# Patient Record
Sex: FEMALE | Race: WHITE | NOT HISPANIC OR LATINO
[De-identification: names, ages, dates, MRNs, and addresses within clinical notes are randomized per-mention and may not be internally consistent; named-entity substitution may affect disease eponyms.]

---

## 2019-05-30 ENCOUNTER — APPOINTMENT (OUTPATIENT)
Dept: ORTHOPEDIC SURGERY | Facility: CLINIC | Age: 54
End: 2019-05-30
Payer: COMMERCIAL

## 2019-05-30 VITALS — HEIGHT: 63 IN | BODY MASS INDEX: 28.35 KG/M2 | WEIGHT: 160 LBS

## 2019-05-30 PROBLEM — Z00.00 ENCOUNTER FOR PREVENTIVE HEALTH EXAMINATION: Status: ACTIVE | Noted: 2019-05-30

## 2019-05-30 PROCEDURE — 73562 X-RAY EXAM OF KNEE 3: CPT

## 2019-05-30 PROCEDURE — 99204 OFFICE O/P NEW MOD 45 MIN: CPT

## 2019-05-30 NOTE — REASON FOR VISIT
[Initial Visit] : an initial visit for [FreeTextEntry2] : OLD NOTES REFLECT IN [place] - RIGHT KNEE PAIN

## 2019-05-30 NOTE — HISTORY OF PRESENT ILLNESS
[de-identified] : PATIENT HERE FOR RIGHT KNEE PAIN - RE-ORDER GEL INJECTIONS Patient is complaining of worsening right knee medial pain because she has a history where she is doing quite well with Orthovisc injections in the past.\par

## 2019-05-30 NOTE — DISCUSSION/SUMMARY
[de-identified] : We will reorder Orthovisc injections and notified the patient once they are available

## 2019-05-30 NOTE — PHYSICAL EXAM
[de-identified] : Right knee has definite medial joint line tenderness or some mild warmth about the knee there is no lateral tenderness and no patellofemoral pain on palpation today. Range of motion is 0-125°. The knee is stable. [de-identified] : AP standing individual laterals and sunrise views were obtained showing moderate into the medial joint space on standing AP projection of the right knee.Patellofemoral and lateral compartments are fairly well maintained.

## 2019-07-11 ENCOUNTER — RX RENEWAL (OUTPATIENT)
Age: 54
End: 2019-07-11

## 2019-07-15 ENCOUNTER — APPOINTMENT (OUTPATIENT)
Dept: PHYSICAL MEDICINE AND REHAB | Facility: CLINIC | Age: 54
End: 2019-07-15

## 2019-08-01 ENCOUNTER — OTHER (OUTPATIENT)
Age: 54
End: 2019-08-01

## 2019-08-01 ENCOUNTER — APPOINTMENT (OUTPATIENT)
Dept: ORTHOPEDIC SURGERY | Facility: CLINIC | Age: 54
End: 2019-08-01
Payer: COMMERCIAL

## 2019-08-01 PROCEDURE — 99214 OFFICE O/P EST MOD 30 MIN: CPT | Mod: 25

## 2019-08-01 PROCEDURE — 20611 DRAIN/INJ JOINT/BURSA W/US: CPT | Mod: RT

## 2019-08-01 RX ORDER — SODIUM HYALURONATE INTRA-ARTICULAR SOLN PREF SYR 25 MG/2.5ML 25/2.5 MG/ML
25 SOLUTION PREFILLED SYRINGE INTRAARTICULAR
Qty: 6 | Refills: 0 | Status: ACTIVE | OUTPATIENT
Start: 2019-07-11

## 2019-08-01 RX ORDER — HYALURONATE SODIUM 30 MG/2 ML
30 SYRINGE (ML) INTRAARTICULAR
Qty: 3 | Refills: 0 | Status: ACTIVE | OUTPATIENT
Start: 2019-05-30

## 2019-08-01 NOTE — PROCEDURE
[de-identified] : Patient is here for the first of the series of 3 view Euflexxa injections. Patient had the injection performed under sterile conditions an ultrasound guidance. Procedure was tolerated well

## 2019-08-01 NOTE — PHYSICAL EXAM
[de-identified] : Right knee has definite medial joint line tenderness or some mild warmth about the knee there is no lateral tenderness and no patellofemoral pain on palpation today. Range of motion is 0-125°. The knee is stable.

## 2019-08-05 ENCOUNTER — OTHER (OUTPATIENT)
Age: 54
End: 2019-08-05

## 2019-08-07 ENCOUNTER — APPOINTMENT (OUTPATIENT)
Dept: ORTHOPEDIC SURGERY | Facility: CLINIC | Age: 54
End: 2019-08-07
Payer: COMMERCIAL

## 2019-08-07 VITALS — BODY MASS INDEX: 28.35 KG/M2 | WEIGHT: 160 LBS | HEIGHT: 63 IN

## 2019-08-07 PROCEDURE — 99214 OFFICE O/P EST MOD 30 MIN: CPT | Mod: 25

## 2019-08-07 PROCEDURE — 20611 DRAIN/INJ JOINT/BURSA W/US: CPT | Mod: RT

## 2019-08-07 NOTE — PHYSICAL EXAM
[de-identified] : Right knee has definite medial joint line tenderness or some mild warmth about the knee there is no lateral tenderness and no patellofemoral pain on palpation today. Range of motion is 0-125°. The knee is stable.

## 2019-08-07 NOTE — PROCEDURE
[de-identified] : Patient is here for the second  of the series of 3 view Euflexxa injections. Patient had the injection performed under sterile conditions an ultrasound guidance. Procedure was tolerated well

## 2019-08-14 ENCOUNTER — APPOINTMENT (OUTPATIENT)
Dept: ORTHOPEDIC SURGERY | Facility: CLINIC | Age: 54
End: 2019-08-14
Payer: COMMERCIAL

## 2019-08-14 VITALS — BODY MASS INDEX: 28.35 KG/M2 | HEIGHT: 63 IN | WEIGHT: 160 LBS

## 2019-08-14 PROCEDURE — 99214 OFFICE O/P EST MOD 30 MIN: CPT

## 2019-08-14 NOTE — PHYSICAL EXAM
[de-identified] : Right knee has definite medial joint line tenderness or some mild warmth about the knee there is no lateral tenderness and no patellofemoral pain on palpation today. Range of motion is 0-125°. The knee is stable.

## 2019-10-28 ENCOUNTER — APPOINTMENT (OUTPATIENT)
Dept: ORTHOPEDIC SURGERY | Facility: CLINIC | Age: 54
End: 2019-10-28
Payer: COMMERCIAL

## 2019-10-28 VITALS — BODY MASS INDEX: 28.35 KG/M2 | WEIGHT: 160 LBS | HEIGHT: 63 IN

## 2019-10-28 DIAGNOSIS — M17.12 UNILATERAL PRIMARY OSTEOARTHRITIS, LEFT KNEE: ICD-10-CM

## 2019-10-28 DIAGNOSIS — M23.92 UNSPECIFIED INTERNAL DERANGEMENT OF LEFT KNEE: ICD-10-CM

## 2019-10-28 PROCEDURE — 20610 DRAIN/INJ JOINT/BURSA W/O US: CPT | Mod: LT

## 2019-10-28 PROCEDURE — 99213 OFFICE O/P EST LOW 20 MIN: CPT | Mod: 25

## 2019-10-28 PROCEDURE — 73562 X-RAY EXAM OF KNEE 3: CPT | Mod: LT

## 2019-10-28 RX ORDER — MELOXICAM 15 MG/1
15 TABLET ORAL DAILY
Qty: 30 | Refills: 3 | Status: ACTIVE | COMMUNITY
Start: 2019-10-28 | End: 1900-01-01

## 2019-10-28 NOTE — REVIEW OF SYSTEMS
[Arthralgia] : arthralgia [Joint Pain] : joint pain [Joint Stiffness] : no joint stiffness [Joint Swelling] : no joint swelling

## 2019-10-28 NOTE — DISCUSSION/SUMMARY
[Medication Risks Reviewed] : Medication risks reviewed [de-identified] : Injection given into the left knee. I placed her on meloxicam. She will use ice. The pain doesn't improve she'll let me know.

## 2019-10-28 NOTE — HISTORY OF PRESENT ILLNESS
[de-identified] : Location: left knee\par Quality: aching ,sharp\par Duration: 1 week\par Context: Atraumatic\par Aggravating Factors: walking, bending, prolonged sitting, night time \par Conservative treatment: Elevation , compression , ice , heat otc, \par Associated Symptoms:stiffness\par Prior Studies: n.a \par

## 2019-10-28 NOTE — PHYSICAL EXAM
[de-identified] : Left knee shows no warmth or swelling. There is both medial and lateral joint line tenderness. There is no evidence of instability. There is a negative Royal test painful range of motion neurovascular exam is normal straight leg raising negative. [de-identified] : Left knee x-ray shows evidence of patellofemoral degenerative arthritis unchanged from 5/30/2019

## 2020-06-18 RX ORDER — SODIUM HYALURONATE INTRA-ARTICULAR SOLN PREF SYR 25 MG/2.5ML 25/2.5 MG/ML
25 SOLUTION PREFILLED SYRINGE INTRAARTICULAR
Qty: 1 | Refills: 0 | Status: ACTIVE | OUTPATIENT
Start: 2020-06-18

## 2020-11-20 ENCOUNTER — APPOINTMENT (OUTPATIENT)
Dept: ORTHOPEDIC SURGERY | Facility: CLINIC | Age: 55
End: 2020-11-20
Payer: COMMERCIAL

## 2020-11-20 PROCEDURE — 20611 DRAIN/INJ JOINT/BURSA W/US: CPT | Mod: LT

## 2020-11-20 PROCEDURE — 99214 OFFICE O/P EST MOD 30 MIN: CPT | Mod: 25

## 2020-11-20 NOTE — PROCEDURE
[de-identified] : Injection 1/3\par Patient was given an supartz njection 30mg/2ml  in the lateral compartment of the knee. Injection is performed under sterile conditions with ultrasound guidance. Patient tolerated procedure well. \par

## 2020-11-20 NOTE — PHYSICAL EXAM
[de-identified] : Right knee has definite medial joint line tenderness or some mild warmth about the knee there is no lateral tenderness and no patellofemoral pain on palpation today. Range of motion is 0-125°. The knee is stable. \par \par

## 2020-11-20 NOTE — HISTORY OF PRESENT ILLNESS
[de-identified] : Pt is here for injection 1/3 supartz  [Stable] : stable [5] : a current pain level of 5/10 [Walking] : walking [Sitting] : sitting [Standing] : standing

## 2020-11-23 ENCOUNTER — APPOINTMENT (OUTPATIENT)
Dept: ORTHOPEDIC SURGERY | Facility: CLINIC | Age: 55
End: 2020-11-23
Payer: COMMERCIAL

## 2020-11-23 PROCEDURE — 20611 DRAIN/INJ JOINT/BURSA W/US: CPT | Mod: RT

## 2020-11-23 PROCEDURE — 99214 OFFICE O/P EST MOD 30 MIN: CPT | Mod: 25

## 2020-11-23 RX ORDER — BUPROPION HYDROCHLORIDE 300 MG/1
300 TABLET, EXTENDED RELEASE ORAL
Qty: 90 | Refills: 0 | Status: ACTIVE | COMMUNITY
Start: 2020-07-28

## 2020-11-23 RX ORDER — ALPRAZOLAM 1 MG/1
1 TABLET ORAL
Qty: 60 | Refills: 0 | Status: ACTIVE | COMMUNITY
Start: 2020-11-03

## 2020-11-23 NOTE — PHYSICAL EXAM
[de-identified] : Left knee has definite medial joint line tenderness or some mild warmth about the knee there is no lateral tenderness and no patellofemoral pain on palpation today. Range of motion is 0-125°. The knee is stable.

## 2020-11-23 NOTE — PROCEDURE
[de-identified] : Patient was given the second of 3 injections right knee today. Patient tolerated the procedure well

## 2020-11-23 NOTE — HISTORY OF PRESENT ILLNESS
[de-identified] : Patient is here for the second of 3 right and Supartz injection series with some modest improvement.

## 2020-11-30 ENCOUNTER — APPOINTMENT (OUTPATIENT)
Dept: ORTHOPEDIC SURGERY | Facility: CLINIC | Age: 55
End: 2020-11-30
Payer: COMMERCIAL

## 2020-11-30 PROCEDURE — 99072 ADDL SUPL MATRL&STAF TM PHE: CPT

## 2020-11-30 PROCEDURE — 20611 DRAIN/INJ JOINT/BURSA W/US: CPT | Mod: RT

## 2020-11-30 PROCEDURE — 99214 OFFICE O/P EST MOD 30 MIN: CPT | Mod: 25

## 2020-11-30 NOTE — PHYSICAL EXAM
[de-identified] : Right knee has definite medial joint line tenderness or some mild warmth about the knee there is no lateral tenderness and no patellofemoral pain on palpation today. Range of motion is 0-125°. The knee is stable. \par \par

## 2020-11-30 NOTE — HISTORY OF PRESENT ILLNESS
[de-identified] : pt is here for 3/3 supartz injection.- pt is tolerating injections well. pt states today knee feels more ache but amounts that to the weather.  [Stable] : stable [5] : a current pain level of 5/10

## 2020-12-07 ENCOUNTER — APPOINTMENT (OUTPATIENT)
Dept: ORTHOPEDIC SURGERY | Facility: CLINIC | Age: 55
End: 2020-12-07

## 2021-05-10 ENCOUNTER — APPOINTMENT (OUTPATIENT)
Dept: ORTHOPEDIC SURGERY | Facility: CLINIC | Age: 56
End: 2021-05-10
Payer: COMMERCIAL

## 2021-05-10 PROCEDURE — 99072 ADDL SUPL MATRL&STAF TM PHE: CPT

## 2021-05-10 PROCEDURE — 20611 DRAIN/INJ JOINT/BURSA W/US: CPT | Mod: RT

## 2021-05-10 PROCEDURE — 99214 OFFICE O/P EST MOD 30 MIN: CPT | Mod: 25

## 2021-05-10 RX ORDER — HYALURONATE SODIUM 30 MG/2 ML
30 SYRINGE (ML) INTRAARTICULAR
Qty: 3 | Refills: 0 | Status: ACTIVE | OUTPATIENT
Start: 2021-05-10

## 2021-05-10 RX ORDER — TACROLIMUS 1 MG/G
0.1 OINTMENT TOPICAL
Qty: 30 | Refills: 0 | Status: ACTIVE | COMMUNITY
Start: 2021-03-23

## 2021-05-10 NOTE — HISTORY OF PRESENT ILLNESS
[de-identified] : Patient is here once again resumption of right knee medial pain. Pushes fairly advanced osteoarthritis has done well with Orthovisc and cortisone injections in the past.

## 2021-05-10 NOTE — PROCEDURE
[de-identified] :  The patient was given a cortisone injection to the lateral compartment right knee today. This is done sterile conditions an ultrasound guidance.Patient tolerated the procedure well

## 2021-05-10 NOTE — PHYSICAL EXAM
[de-identified] : Right knee has definite medial joint line tenderness or some mild warmth about the knee there is no lateral tenderness and no patellofemoral pain on palpation today. Range of motion is 0-125°. The knee is stable.

## 2021-08-23 RX ORDER — HYALURONATE SODIUM 30 MG/2 ML
30 SYRINGE (ML) INTRAARTICULAR
Qty: 3 | Refills: 0 | Status: ACTIVE | OUTPATIENT
Start: 2021-08-23

## 2021-08-31 ENCOUNTER — APPOINTMENT (OUTPATIENT)
Dept: ORTHOPEDIC SURGERY | Facility: CLINIC | Age: 56
End: 2021-08-31
Payer: COMMERCIAL

## 2021-08-31 PROCEDURE — 73521 X-RAY EXAM HIPS BI 2 VIEWS: CPT

## 2021-08-31 PROCEDURE — 99214 OFFICE O/P EST MOD 30 MIN: CPT

## 2021-08-31 RX ORDER — MOMETASONE FUROATE 1 MG/G
0.1 OINTMENT TOPICAL
Qty: 15 | Refills: 0 | Status: ACTIVE | COMMUNITY
Start: 2021-05-10

## 2021-08-31 RX ORDER — DIPHENOXYLATE HYDROCHLORIDE AND ATROPINE SULFATE 2.5; .025 MG/1; MG/1
2.5-0.025 TABLET ORAL
Qty: 30 | Refills: 0 | Status: ACTIVE | COMMUNITY
Start: 2021-05-10

## 2021-08-31 RX ORDER — CIPROFLOXACIN HYDROCHLORIDE 500 MG/1
500 TABLET, FILM COATED ORAL
Qty: 14 | Refills: 0 | Status: ACTIVE | COMMUNITY
Start: 2021-05-10

## 2021-08-31 RX ORDER — VALACYCLOVIR 1 G/1
1 TABLET, FILM COATED ORAL
Qty: 16 | Refills: 0 | Status: ACTIVE | COMMUNITY
Start: 2021-05-10

## 2021-08-31 RX ORDER — MUPIROCIN 20 MG/G
2 OINTMENT TOPICAL
Qty: 22 | Refills: 0 | Status: ACTIVE | COMMUNITY
Start: 2021-05-10

## 2021-08-31 RX ORDER — METHYLPREDNISOLONE 4 MG/1
4 TABLET ORAL
Qty: 1 | Refills: 0 | Status: ACTIVE | COMMUNITY
Start: 2021-08-31 | End: 1900-01-01

## 2021-08-31 RX ORDER — METHYLPREDNISOLONE 4 MG/1
4 TABLET ORAL
Qty: 21 | Refills: 0 | Status: ACTIVE | COMMUNITY
Start: 2021-05-10

## 2021-08-31 NOTE — PROCEDURE
Chief Complaint   Patient presents with   • Knee Pain     ambulates limping in triage c/o bilateral knee pain x 3 months. has hx of meniscus tear/surgery in left knee.      Denies trauma/fall. Educated on triage process. Instructed to notify staff for any worsening symptoms. Denies any recent travel. Denies exposure to known covid positive patients. Denies any respiratory symptoms.    [de-identified] : Injection 3/3\par Patient was given an supartz injection 30mg/2ml  in the lateral compartment of the RIGHT knee. Injection is performed under sterile conditions with ultrasound guidance. Patient tolerated procedure well. \par

## 2021-08-31 NOTE — HISTORY OF PRESENT ILLNESS
[de-identified] : This patient presents today with a new complaint of right buttock and lower back pain. His ongoing for about 2 weeks she'll call no specific accident or injury she states it seems to be most symptomatic when she is resting and lying down at night pain is nonradiating she denies any change in bowel or bladder habits she denies also any weakness or numbness in the right lower extremity pain does radiate a little bit into the thigh.

## 2021-08-31 NOTE — REASON FOR VISIT
[Follow-Up Visit] : a follow-up visit for [FreeTextEntry2] : RIGHT HIP PAIN AND SWELLING ([place] ARE[place]) WANTE D  TO GET NEW XRAYS

## 2021-08-31 NOTE — PHYSICAL EXAM
[de-identified] : Patient's lumbar exam she has full passive internal and external rotation of the right hip with no restrictions or pain no significant right quad atrophy is noted she is neurovascularly intact distally negative straight leg raising test. Patient does have tenderness palpation of the right-sided paraspinal musculature. [de-identified] : AP lateral of both hips were obtained showing notice any bony abnormalities cartilage spaces are well-maintained

## 2021-08-31 NOTE — DISCUSSION/SUMMARY
[Medication Risks Reviewed] : Medication risks reviewed [de-identified] : We talked about her options patient's clinical exam history and radiographs were consistent with right-sided lumbar radiculopathy. We'll place her on a Medrol Dosepak a reasonable risk benefits of medication discussed patient will follow up with me in 10 days to 2 weeks if not thoroughly improved.

## 2021-12-03 ENCOUNTER — APPOINTMENT (OUTPATIENT)
Dept: ORTHOPEDIC SURGERY | Facility: CLINIC | Age: 56
End: 2021-12-03
Payer: COMMERCIAL

## 2021-12-03 PROCEDURE — 99214 OFFICE O/P EST MOD 30 MIN: CPT | Mod: 25

## 2021-12-03 PROCEDURE — 20611 DRAIN/INJ JOINT/BURSA W/US: CPT | Mod: RT

## 2021-12-03 NOTE — PHYSICAL EXAM
[de-identified] : Right knee has definite medial joint line tenderness or some mild warmth about the knee there is no lateral tenderness and no patellofemoral pain on palpation today. Range of motion is 0-125°. The knee is stable. \par \par

## 2021-12-03 NOTE — PROCEDURE
[de-identified] : Injection 1/3\par Patient was given an Orthovisc injection 30mg/2ml  in the lateral compartment of the knee. Injection is performed under sterile conditions with ultrasound guidance. Patient tolerated procedure well. \par

## 2021-12-10 ENCOUNTER — APPOINTMENT (OUTPATIENT)
Dept: ORTHOPEDIC SURGERY | Facility: CLINIC | Age: 56
End: 2021-12-10
Payer: COMMERCIAL

## 2021-12-10 PROCEDURE — 20611 DRAIN/INJ JOINT/BURSA W/US: CPT | Mod: RT

## 2021-12-10 NOTE — PHYSICAL EXAM
[de-identified] : Right knee has definite medial joint line tenderness or some mild warmth about the knee there is no lateral tenderness and no patellofemoral pain on palpation today. Range of motion is 0-125°. The knee is stable. \par \par

## 2021-12-10 NOTE — PROCEDURE
[de-identified] : Injection 2/3\par Patient was given an Orthovisc injection 30mg/2ml  in the lateral compartment of the knee. Injection is performed under sterile conditions with ultrasound guidance. Patient tolerated procedure well. \par

## 2021-12-17 ENCOUNTER — APPOINTMENT (OUTPATIENT)
Dept: ORTHOPEDIC SURGERY | Facility: CLINIC | Age: 56
End: 2021-12-17
Payer: COMMERCIAL

## 2021-12-17 PROCEDURE — 99214 OFFICE O/P EST MOD 30 MIN: CPT | Mod: 25

## 2021-12-17 PROCEDURE — 20611 DRAIN/INJ JOINT/BURSA W/US: CPT | Mod: RT

## 2021-12-17 NOTE — PROCEDURE
[de-identified] : Patient was given a third and final left knee HA injection. This was done and sterile conditions and ultrasound guidance. Patient tolerated the procedure well.

## 2021-12-17 NOTE — PHYSICAL EXAM
[de-identified] : Right knee continues to have some mild warmth and soft tissue swelling as compared to the left range of motion 0-125° stable to AP stress varus stress

## 2021-12-17 NOTE — DISCUSSION/SUMMARY
[de-identified] : Patient will consider knee replacement surgery do not see sustained symptomatic improvement with these conservative measures.

## 2023-05-08 NOTE — PROCEDURE
[de-identified] : Patient is here for the THIRD of the series of 3 view Euflexxa injections. Patient had the injection performed under sterile conditions an ultrasound guidance. Procedure was tolerated well
Unable to assess hearing

## 2023-08-01 ENCOUNTER — APPOINTMENT (OUTPATIENT)
Dept: ORTHOPEDIC SURGERY | Facility: CLINIC | Age: 58
End: 2023-08-01
Payer: COMMERCIAL

## 2023-08-01 PROCEDURE — 99214 OFFICE O/P EST MOD 30 MIN: CPT

## 2023-08-01 RX ORDER — HYALURONATE SODIUM 30 MG/2 ML
30 SYRINGE (ML) INTRAARTICULAR
Qty: 3 | Refills: 0 | Status: ACTIVE | OUTPATIENT
Start: 2023-08-01

## 2023-08-01 RX ORDER — HYALURONATE SODIUM, STABILIZED 88 MG/4 ML
88 SYRINGE (ML) INTRAARTICULAR
Qty: 1 | Refills: 0 | Status: ACTIVE | OUTPATIENT
Start: 2023-08-01

## 2023-08-10 NOTE — HISTORY OF PRESENT ILLNESS
[de-identified] : Well-known patient returns today complaining of left knee pain.  Patient seen most recent December 21, 2022 at that point she was given an HA injection.  She did well for a while but recent exacerbation of left knee pain especially with stair climbing getting out of seated position pain is mostly anterior in the knee.

## 2023-08-10 NOTE — PHYSICAL EXAM
[de-identified] : Left knee exam today patient has tenderness to the medial joint line range of motion 0 to 125 degrees knee stable to stress varus valgus stress with full extension and 90 degrees flexion there is warmth noted soft tissue and with no obvious effusion.  Quad tone is good.

## 2023-08-10 NOTE — DISCUSSION/SUMMARY
[de-identified] : Patient I discussed at length the underlying etiology of her left knee pain.  I believe she is suffering with osteoarthritic flare.  Patient will be sent for repeat radiographs.  In the past she has done well with HA injections.  These will be reordered pending the reading of the radiographs to make sure there is no significant advancement of the radiographic findings of arthritis.  Today's consultation lasted 35.

## 2023-08-10 NOTE — REASON FOR VISIT
[Follow-Up Visit] : a follow-up visit for [Knee Pain] : knee pain [FreeTextEntry2] : RIGHT KNEE PAIN. PATIENT RECIEVED THE ORTHO GEL INJECTIONS IN THE PAST AND WANTS TO HAVE AN UPDATED VISIT TO GET THOSE GELS AGAIN

## 2023-08-11 ENCOUNTER — APPOINTMENT (OUTPATIENT)
Dept: ORTHOPEDIC SURGERY | Facility: CLINIC | Age: 58
End: 2023-08-11
Payer: COMMERCIAL

## 2023-08-11 DIAGNOSIS — E66.3 OVERWEIGHT: ICD-10-CM

## 2023-08-11 PROCEDURE — 99213 OFFICE O/P EST LOW 20 MIN: CPT | Mod: 25

## 2023-08-11 PROCEDURE — 20611 DRAIN/INJ JOINT/BURSA W/US: CPT | Mod: RT

## 2023-08-11 PROCEDURE — 73562 X-RAY EXAM OF KNEE 3: CPT | Mod: 50

## 2023-08-11 NOTE — PROCEDURE
[de-identified] : SUPARTZ FX BIOVENTUS NDC [address]-3 LOT# [id number] EXP 2026/08/31 25MG/ 2.5ML  Patient given a one-time Supartz injection of the lateral compartment the right knee today.  This done in sterile conditions and ultrasound guidance patient tolerated injection well.

## 2023-08-11 NOTE — DISCUSSION/SUMMARY
[de-identified] : In addition to today's HA injection we talked at length about conservative measures to help reduce her discomfort including liberal icing of the knee and anti-inflammatory use.  The patient does not see sustained symptomatic improvement and may consider knee replacement surgery sometime over the upcoming winter.  We also talked at length today about needing to employ strategy to help her lose her weight and reduce her BMI.  We talked at length about how weight and BMI reduction will help improve her current symptoms also improve the efficacy of today's injection and potentially reduce perioperative complications at time of knee replacement surgery in the future.  Today's consultation per weight/BMI reduction lasted 20 minutes.

## 2023-08-11 NOTE — REASON FOR VISIT
[Follow-Up Visit] : a follow-up visit for [Knee Pain] : knee pain [FreeTextEntry2] : RIGHT KNEE SUPARTZ GEL INJ

## 2023-08-11 NOTE — PHYSICAL EXAM
[de-identified] : Right knee exam today patient has tenderness to the medial joint line range of motion 0 to 125 degrees knee stable to stress varus valgus stress with full extension and 90 degrees flexion there is warmth noted soft tissue and with no obvious effusion.  Quad tone is good. [de-identified] : Knee radiographs were ordered today AP standing individual lateral sunrise views were obtained showing near complete cartilage loss in the medial aspect of the right knee with only 5 to 10% remaining cartilage patellofemoral articulation also shows moderate narrowing.

## 2023-08-17 ENCOUNTER — APPOINTMENT (OUTPATIENT)
Dept: ORTHOPEDIC SURGERY | Facility: CLINIC | Age: 58
End: 2023-08-17
Payer: COMMERCIAL

## 2023-08-17 DIAGNOSIS — M17.0 BILATERAL PRIMARY OSTEOARTHRITIS OF KNEE: ICD-10-CM

## 2023-08-17 PROCEDURE — 20611 DRAIN/INJ JOINT/BURSA W/US: CPT | Mod: 50

## 2023-08-19 NOTE — ASSESSMENT
[FreeTextEntry1] : Discussed at length with patient underlying arthritis and treatment options.  Patient elects viscosupplementation today.

## 2023-08-19 NOTE — PHYSICAL EXAM
[de-identified] : Bilateral knee  Constitutional:  The patient is healthy-appearing and in no apparent distress.   Gait: The patient ambulates with a normal gait and no limp.  Cardiovascular System:  The capillary refill is less than 2 seconds.   Skin:  There are no skin abnormalities.  Right Knee:   Bony Palpation:  There is tenderness of the medial joint line.  There is no tenderness of the lateral joint line. There is tenderness of the medial femoral chondyle. There is no tenderness of the lateral femoral chondyle. There is no tenderness of the tibial tubercle. There is no tenderness of the superior patella. There is no tenderness of the inferior patella. There is tenderness of the medial patellar facet. There is tenderness of the lateral patellar facet.  Soft Tissue Palpation:  There is no tenderness of the medial retinaculum. There is no tenderness of the lateral retinaculum. There is no tenderness of the quadriceps tendon. There is no tenderness of the patella tendon. There is no tenderness of the ITB. There is no tenderness of the pes anserine.  Active Range of Motion:  The range of motion at the knee actively and passively is full.   Special Tests:  There is a negative Apley. There is a negative Steinmanns.  There is a negative Lachman and Anterior Drawer. There is a negative Posterior Drawer.   There is no varus or valgus laxity.  Strength:  There is 5/5 hip flexion and 5/5 knee flexion and extension.    Left Knee:   Bony Palpation:  There is  tenderness of the medial joint line.  There is no tenderness of the lateral joint line. There is tenderness of the medial femoral chondyle. There is tenderness of the lateral femoral chondyle. There is no tenderness of the tibial tubercle. There is no tenderness of the superior patella. There is no tenderness of the inferior patella. There is tenderness of the medial patellar facet. There is tenderness of the lateral patellar facet.  Soft Tissue Palpation:  There is no tenderness of the medial retinaculum. There is no tenderness of the lateral retinaculum. There is no tenderness of the quadriceps tendon. There is no tenderness of the patella tendon. There is no tenderness of the ITB. There is no tenderness of the pes anserine.  Active Range of Motion:  The range of motion at the knee actively and passively is full.   Special Tests:  There is a negative Apley. There is a negative Steinmanns.  There is a negative Lachman and Anterior Drawer. There is a negative Posterior Drawer.   There is no varus or valgus laxity.  Strength:  There is 5/5 hip flexion and 5/5 knee flexion and extension.    Psychiatric:  The patient demonstrates a normal mood and affect and is active and alert

## 2023-08-19 NOTE — HISTORY OF PRESENT ILLNESS
[de-identified] : Patient is a new patient presenting for both knees she had seen a colleague 1 week ago presenting for further treatment discussion and options

## 2023-08-19 NOTE — PROCEDURE
[de-identified] : After discussion of the risks and benefits, the patient elects Supartz injections to the knees.  Confirmed that the patient does not have history of prior adverse reactions, active infections, or relevant allergies.  There was no effusion, erythema, or warmth, and the skin was clear. The skin was prepped in the usual sterile manner, ethyl chloride spray was used as a topical anesthetic.  A needle was inserted  UTILIZING ULTRASOUND GUIDANCE TO LOCALIZE THE NEEDLE IN THE KNEE JOINT into the RIGHT KNEE via an anterolateral approach.  Viscosupplement was then slowly injected, The injection was completed without complication and a bandage was applied. The patient tolerated the procedure well and was given post-injection instructions: no exercise x 48 hours, cold packs and analgesics prn.

## 2023-08-24 ENCOUNTER — APPOINTMENT (OUTPATIENT)
Dept: ORTHOPEDIC SURGERY | Facility: CLINIC | Age: 58
End: 2023-08-24
Payer: COMMERCIAL

## 2023-08-24 PROCEDURE — 99213 OFFICE O/P EST LOW 20 MIN: CPT | Mod: 25

## 2023-08-24 PROCEDURE — 20611 DRAIN/INJ JOINT/BURSA W/US: CPT | Mod: RT

## 2023-08-24 NOTE — HISTORY OF PRESENT ILLNESS
[de-identified] : Follow up on 3/3 SUPARTZ at Right Knee Last Visit: 08/17/2023- 2nd Supartz Injection Administered at Right Knee Pain Level: 4/10 Symptoms: Minor Swelling

## 2023-08-24 NOTE — PROCEDURE
[de-identified] : After discussion of the risks and benefits, the patient elects Supartz injections to the knees.  Confirmed that the patient does not have history of prior adverse reactions, active infections, or relevant allergies.  There was no effusion, erythema, or warmth, and the skin was clear. The skin was prepped in the usual sterile manner, ethyl chloride spray was used as a topical anesthetic.  A needle was inserted  UTILIZING ULTRASOUND GUIDANCE TO LOCALIZE THE NEEDLE IN THE KNEE JOINT into the RIGHT KNEE via an anterolateral approach.  Viscosupplement was then slowly injected, The injection was completed without complication and a bandage was applied. The patient tolerated the procedure well and was given post-injection instructions: no exercise x 48 hours, cold packs and analgesics prn.

## 2023-08-24 NOTE — PHYSICAL EXAM
[de-identified] : Bilateral knee  Constitutional:  The patient is healthy-appearing and in no apparent distress.   Gait: The patient ambulates with a normal gait and no limp.  Cardiovascular System:  The capillary refill is less than 2 seconds.   Skin:  There are no skin abnormalities.  Right Knee:   Bony Palpation:  There is tenderness of the medial joint line.  There is no tenderness of the lateral joint line. There is tenderness of the medial femoral chondyle. There is no tenderness of the lateral femoral chondyle. There is no tenderness of the tibial tubercle. There is no tenderness of the superior patella. There is no tenderness of the inferior patella. There is tenderness of the medial patellar facet. There is tenderness of the lateral patellar facet.  Soft Tissue Palpation:  There is no tenderness of the medial retinaculum. There is no tenderness of the lateral retinaculum. There is no tenderness of the quadriceps tendon. There is no tenderness of the patella tendon. There is no tenderness of the ITB. There is no tenderness of the pes anserine.  Active Range of Motion:  The range of motion at the knee actively and passively is full.   Special Tests:  There is a negative Apley. There is a negative Steinmanns.  There is a negative Lachman and Anterior Drawer. There is a negative Posterior Drawer.   There is no varus or valgus laxity.  Strength:  There is 5/5 hip flexion and 5/5 knee flexion and extension.    Left Knee:   Bony Palpation:  There is  tenderness of the medial joint line.  There is no tenderness of the lateral joint line. There is tenderness of the medial femoral chondyle. There is tenderness of the lateral femoral chondyle. There is no tenderness of the tibial tubercle. There is no tenderness of the superior patella. There is no tenderness of the inferior patella. There is tenderness of the medial patellar facet. There is tenderness of the lateral patellar facet.  Soft Tissue Palpation:  There is no tenderness of the medial retinaculum. There is no tenderness of the lateral retinaculum. There is no tenderness of the quadriceps tendon. There is no tenderness of the patella tendon. There is no tenderness of the ITB. There is no tenderness of the pes anserine.  Active Range of Motion:  The range of motion at the knee actively and passively is full.   Special Tests:  There is a negative Apley. There is a negative Steinmanns.  There is a negative Lachman and Anterior Drawer. There is a negative Posterior Drawer.   There is no varus or valgus laxity.  Strength:  There is 5/5 hip flexion and 5/5 knee flexion and extension.    Psychiatric:  The patient demonstrates a normal mood and affect and is active and alert

## 2023-11-28 ENCOUNTER — APPOINTMENT (OUTPATIENT)
Dept: ORTHOPEDIC SURGERY | Facility: CLINIC | Age: 58
End: 2023-11-28
Payer: COMMERCIAL

## 2023-11-28 PROCEDURE — 99215 OFFICE O/P EST HI 40 MIN: CPT

## 2024-01-16 ENCOUNTER — TRANSCRIPTION ENCOUNTER (OUTPATIENT)
Age: 59
End: 2024-01-16

## 2024-01-16 VITALS
HEIGHT: 62 IN | SYSTOLIC BLOOD PRESSURE: 109 MMHG | RESPIRATION RATE: 16 BRPM | WEIGHT: 138.23 LBS | DIASTOLIC BLOOD PRESSURE: 68 MMHG | TEMPERATURE: 98 F | HEART RATE: 65 BPM

## 2024-01-16 RX ORDER — POVIDONE-IODINE 5 %
1 AEROSOL (ML) TOPICAL ONCE
Refills: 0 | Status: COMPLETED | OUTPATIENT
Start: 2024-01-17 | End: 2024-01-17

## 2024-01-16 RX ORDER — TIRZEPATIDE 15 MG/.5ML
5 INJECTION, SOLUTION SUBCUTANEOUS
Refills: 0 | DISCHARGE

## 2024-01-16 NOTE — ASU PATIENT PROFILE, ADULT - FALL HARM RISK - UNIVERSAL INTERVENTIONS
Bed in lowest position, wheels locked, appropriate side rails in place/Call bell, personal items and telephone in reach/Instruct patient to call for assistance before getting out of bed or chair/Non-slip footwear when patient is out of bed/[place] to call system/Physically safe environment - no spills, clutter or unnecessary equipment/Purposeful Proactive Rounding/Room/bathroom lighting operational, light cord in reach Bed in lowest position, wheels locked, appropriate side rails in place/Call bell, personal items and telephone in reach/Instruct patient to call for assistance before getting out of bed or chair/Non-slip footwear when patient is out of bed/[place] to call system/Physically safe environment - no spills, clutter or unnecessary equipment/Purposeful Proactive Rounding/Room/bathroom lighting operational, light cord in reach

## 2024-01-16 NOTE — H&P ADULT - PROBLEM SELECTOR PLAN 1
Admit to Orthopedic Service   For elective Right Total Knee Replacement   Medically cleared and optimized for surgery by Dr. Briceño

## 2024-01-16 NOTE — H&P ADULT - NSHPPHYSICALEXAM_GEN_ALL_CORE
Gen: 57 y/o female, NAD  MSK: decreased ROM 2/2 pain at the right knee       Remainder of exam per medical clearance note Gen: 59 y/o female, NAD  MSK: decreased ROM 2/2 pain at the right knee       Remainder of exam per medical clearance note Gen: 57 y/o female, NAD  MSK: MSK: Skin warm and well perfused. DP pulse right lower extremity.  Sensation intact and equal bilateral lower extremities. EHL/TA/GS/FHL 5/5 bilateral lower extremities. decreased ROM 2/2 pain at the right knee       Remainder of exam per medical clearance note

## 2024-01-16 NOTE — H&P ADULT - NSHPLABSRESULTS_GEN_ALL_CORE
Preop CBC, BMP, PT/INR, PTT within normal range  Cr 0.82  H/H 13.2/39.3  A1C 4.8  UAL trace leuks WBC 0-5  Preop EKG NSR and reviewed per medical clearance

## 2024-01-16 NOTE — H&P ADULT - HISTORY OF PRESENT ILLNESS
59 y/o female with right knee pain x  Patient elects to undergo right total knee replacement with Dr. Davalos  57 y/o female with right knee pain despite conservative therapies for her symptoms including right knee injections and OTC pain control with aleve. Denies history of DVT.   Patient elects to undergo right total knee replacement with Dr. Davalos  59 y/o female with right knee pain despite conservative therapies for her symptoms including right knee injections and OTC pain control with aleve. Denies history of DVT.   Patient elects to undergo right total knee replacement with Dr. Davalos

## 2024-01-17 ENCOUNTER — INPATIENT (INPATIENT)
Facility: HOSPITAL | Age: 59
LOS: 0 days | Discharge: HOME | DRG: 470 | End: 2024-01-18
Attending: SPECIALIST | Admitting: SPECIALIST
Payer: COMMERCIAL

## 2024-01-17 ENCOUNTER — RESULT REVIEW (OUTPATIENT)
Age: 59
End: 2024-01-17

## 2024-01-17 ENCOUNTER — APPOINTMENT (OUTPATIENT)
Dept: ORTHOPEDIC SURGERY | Facility: HOSPITAL | Age: 59
End: 2024-01-17
Payer: COMMERCIAL

## 2024-01-17 DIAGNOSIS — F41.9 ANXIETY DISORDER, UNSPECIFIED: ICD-10-CM

## 2024-01-17 DIAGNOSIS — Z98.890 OTHER SPECIFIED POSTPROCEDURAL STATES: Chronic | ICD-10-CM

## 2024-01-17 DIAGNOSIS — M17.11 UNILATERAL PRIMARY OSTEOARTHRITIS, RIGHT KNEE: ICD-10-CM

## 2024-01-17 PROCEDURE — 73560 X-RAY EXAM OF KNEE 1 OR 2: CPT | Mod: 26,RT

## 2024-01-17 PROCEDURE — 27447 TOTAL KNEE ARTHROPLASTY: CPT | Mod: AS,RT

## 2024-01-17 PROCEDURE — 27447 TOTAL KNEE ARTHROPLASTY: CPT | Mod: RT

## 2024-01-17 DEVICE — PATELLA JOURNEY 29MM 1.5 MM: Type: IMPLANTABLE DEVICE | Site: RIGHT | Status: FUNCTIONAL

## 2024-01-17 DEVICE — PIN TROCAR GEN 1/8 X 3IN: Type: IMPLANTABLE DEVICE | Site: RIGHT | Status: FUNCTIONAL

## 2024-01-17 DEVICE — IMPLANTABLE DEVICE: Type: IMPLANTABLE DEVICE | Site: RIGHT | Status: FUNCTIONAL

## 2024-01-17 DEVICE — BASEPLATE TIB JRNY NP SZ 2 RT: Type: IMPLANTABLE DEVICE | Site: RIGHT | Status: FUNCTIONAL

## 2024-01-17 DEVICE — PIN MIS RIMMED 3.2X45MM STRL: Type: IMPLANTABLE DEVICE | Site: RIGHT | Status: FUNCTIONAL

## 2024-01-17 DEVICE — FEM COMP JRNY II BCS BICRUCIATE RT SZ 3: Type: IMPLANTABLE DEVICE | Site: RIGHT | Status: FUNCTIONAL

## 2024-01-17 DEVICE — CEMENT PALACOS R: Type: IMPLANTABLE DEVICE | Site: RIGHT | Status: FUNCTIONAL

## 2024-01-17 RX ORDER — ALPRAZOLAM 0.25 MG
1 TABLET ORAL
Refills: 0 | DISCHARGE

## 2024-01-17 RX ORDER — DIPHENHYDRAMINE HCL 50 MG
25 CAPSULE ORAL EVERY 6 HOURS
Refills: 0 | Status: DISCONTINUED | OUTPATIENT
Start: 2024-01-17 | End: 2024-01-18

## 2024-01-17 RX ORDER — TRAMADOL HYDROCHLORIDE 50 MG/1
50 TABLET ORAL EVERY 6 HOURS
Refills: 0 | Status: DISCONTINUED | OUTPATIENT
Start: 2024-01-17 | End: 2024-01-17

## 2024-01-17 RX ORDER — HYDROMORPHONE HYDROCHLORIDE 2 MG/ML
0.5 INJECTION INTRAMUSCULAR; INTRAVENOUS; SUBCUTANEOUS
Refills: 0 | Status: DISCONTINUED | OUTPATIENT
Start: 2024-01-17 | End: 2024-01-17

## 2024-01-17 RX ORDER — CELECOXIB 200 MG/1
400 CAPSULE ORAL ONCE
Refills: 0 | Status: COMPLETED | OUTPATIENT
Start: 2024-01-17 | End: 2024-01-17

## 2024-01-17 RX ORDER — ASPIRIN/CALCIUM CARB/MAGNESIUM 324 MG
325 TABLET ORAL DAILY
Refills: 0 | Status: DISCONTINUED | OUTPATIENT
Start: 2024-01-17 | End: 2024-01-18

## 2024-01-17 RX ORDER — OXYCODONE HYDROCHLORIDE 5 MG/1
5 TABLET ORAL
Refills: 0 | Status: DISCONTINUED | OUTPATIENT
Start: 2024-01-17 | End: 2024-01-17

## 2024-01-17 RX ORDER — CEFAZOLIN SODIUM 1 G
2000 VIAL (EA) INJECTION EVERY 8 HOURS
Refills: 0 | Status: COMPLETED | OUTPATIENT
Start: 2024-01-17 | End: 2024-01-18

## 2024-01-17 RX ORDER — HYDROMORPHONE HYDROCHLORIDE 2 MG/ML
0.5 INJECTION INTRAMUSCULAR; INTRAVENOUS; SUBCUTANEOUS
Refills: 0 | Status: COMPLETED | OUTPATIENT
Start: 2024-01-17 | End: 2024-01-24

## 2024-01-17 RX ORDER — HYDROMORPHONE HYDROCHLORIDE 2 MG/ML
0.5 INJECTION INTRAMUSCULAR; INTRAVENOUS; SUBCUTANEOUS EVERY 4 HOURS
Refills: 0 | Status: DISCONTINUED | OUTPATIENT
Start: 2024-01-17 | End: 2024-01-18

## 2024-01-17 RX ORDER — ACETAMINOPHEN 500 MG
1000 TABLET ORAL EVERY 8 HOURS
Refills: 0 | Status: DISCONTINUED | OUTPATIENT
Start: 2024-01-17 | End: 2024-01-18

## 2024-01-17 RX ORDER — ACETAMINOPHEN 500 MG
650 TABLET ORAL EVERY 6 HOURS
Refills: 0 | Status: DISCONTINUED | OUTPATIENT
Start: 2024-01-17 | End: 2024-01-17

## 2024-01-17 RX ORDER — BENZOCAINE AND MENTHOL 5; 1 G/100ML; G/100ML
1 LIQUID ORAL DAILY
Refills: 0 | Status: DISCONTINUED | OUTPATIENT
Start: 2024-01-17 | End: 2024-01-18

## 2024-01-17 RX ORDER — ONDANSETRON 8 MG/1
4 TABLET, FILM COATED ORAL EVERY 6 HOURS
Refills: 0 | Status: DISCONTINUED | OUTPATIENT
Start: 2024-01-17 | End: 2024-01-18

## 2024-01-17 RX ORDER — SENNA PLUS 8.6 MG/1
2 TABLET ORAL AT BEDTIME
Refills: 0 | Status: DISCONTINUED | OUTPATIENT
Start: 2024-01-17 | End: 2024-01-18

## 2024-01-17 RX ORDER — OXYCODONE HYDROCHLORIDE 5 MG/1
10 TABLET ORAL EVERY 4 HOURS
Refills: 0 | Status: DISCONTINUED | OUTPATIENT
Start: 2024-01-17 | End: 2024-01-18

## 2024-01-17 RX ORDER — OXYCODONE HYDROCHLORIDE 5 MG/1
5 TABLET ORAL ONCE
Refills: 0 | Status: DISCONTINUED | OUTPATIENT
Start: 2024-01-17 | End: 2024-01-17

## 2024-01-17 RX ORDER — OXYCODONE HYDROCHLORIDE 5 MG/1
5 TABLET ORAL EVERY 4 HOURS
Refills: 0 | Status: DISCONTINUED | OUTPATIENT
Start: 2024-01-17 | End: 2024-01-18

## 2024-01-17 RX ORDER — BUPROPION HYDROCHLORIDE 150 MG/1
1 TABLET, EXTENDED RELEASE ORAL
Refills: 0 | DISCHARGE

## 2024-01-17 RX ORDER — CHLORHEXIDINE GLUCONATE 213 G/1000ML
1 SOLUTION TOPICAL ONCE
Refills: 0 | Status: COMPLETED | OUTPATIENT
Start: 2024-01-17 | End: 2024-01-17

## 2024-01-17 RX ORDER — POLYETHYLENE GLYCOL 3350 17 G/17G
17 POWDER, FOR SOLUTION ORAL AT BEDTIME
Refills: 0 | Status: DISCONTINUED | OUTPATIENT
Start: 2024-01-17 | End: 2024-01-18

## 2024-01-17 RX ORDER — BUPROPION HYDROCHLORIDE 150 MG/1
300 TABLET, EXTENDED RELEASE ORAL DAILY
Refills: 0 | Status: DISCONTINUED | OUTPATIENT
Start: 2024-01-17 | End: 2024-01-18

## 2024-01-17 RX ORDER — MAGNESIUM HYDROXIDE 400 MG/1
30 TABLET, CHEWABLE ORAL DAILY
Refills: 0 | Status: DISCONTINUED | OUTPATIENT
Start: 2024-01-17 | End: 2024-01-18

## 2024-01-17 RX ORDER — SODIUM CHLORIDE 9 MG/ML
1000 INJECTION, SOLUTION INTRAVENOUS
Refills: 0 | Status: DISCONTINUED | OUTPATIENT
Start: 2024-01-18 | End: 2024-01-18

## 2024-01-17 RX ORDER — ALPRAZOLAM 0.25 MG
1 TABLET ORAL DAILY
Refills: 0 | Status: DISCONTINUED | OUTPATIENT
Start: 2024-01-17 | End: 2024-01-18

## 2024-01-17 RX ADMIN — HYDROMORPHONE HYDROCHLORIDE 0.5 MILLIGRAM(S): 2 INJECTION INTRAMUSCULAR; INTRAVENOUS; SUBCUTANEOUS at 15:46

## 2024-01-17 RX ADMIN — Medication 1000 MILLIGRAM(S): at 22:02

## 2024-01-17 RX ADMIN — Medication 25 MILLIGRAM(S): at 15:59

## 2024-01-17 RX ADMIN — Medication 1 APPLICATION(S): at 10:23

## 2024-01-17 RX ADMIN — POLYETHYLENE GLYCOL 3350 17 GRAM(S): 17 POWDER, FOR SOLUTION ORAL at 22:03

## 2024-01-17 RX ADMIN — SENNA PLUS 2 TABLET(S): 8.6 TABLET ORAL at 22:03

## 2024-01-17 RX ADMIN — OXYCODONE HYDROCHLORIDE 10 MILLIGRAM(S): 5 TABLET ORAL at 21:45

## 2024-01-17 RX ADMIN — HYDROMORPHONE HYDROCHLORIDE 0.5 MILLIGRAM(S): 2 INJECTION INTRAMUSCULAR; INTRAVENOUS; SUBCUTANEOUS at 16:29

## 2024-01-17 RX ADMIN — CELECOXIB 400 MILLIGRAM(S): 200 CAPSULE ORAL at 10:25

## 2024-01-17 RX ADMIN — Medication 100 MILLIGRAM(S): at 22:03

## 2024-01-17 RX ADMIN — OXYCODONE HYDROCHLORIDE 10 MILLIGRAM(S): 5 TABLET ORAL at 20:44

## 2024-01-17 RX ADMIN — CHLORHEXIDINE GLUCONATE 1 APPLICATION(S): 213 SOLUTION TOPICAL at 10:23

## 2024-01-17 RX ADMIN — OXYCODONE HYDROCHLORIDE 5 MILLIGRAM(S): 5 TABLET ORAL at 10:25

## 2024-01-17 NOTE — PROGRESS NOTE ADULT - SUBJECTIVE AND OBJECTIVE BOX
Ortho Post Op Check    Procedure:  Surgeon:    Pt comfortable without complaints, pain controlled  Denies CP, SOB, N/V, numbness/tingling     Vital Signs Last 24 Hrs  T(C): 36.6 (01-17-24 @ 20:32), Max: 36.6 (01-17-24 @ 20:32)  T(F): 97.8 (01-17-24 @ 20:32), Max: 97.8 (01-17-24 @ 20:32)  HR: 65 (01-17-24 @ 20:32) (64 - 65)  BP: 138/75 (01-17-24 @ 20:32) (138/75 - 148/74)  BP(mean): 96 (01-17-24 @ 20:32) (96 - 96)  RR: 17 (01-17-24 @ 20:32) (17 - 18)  SpO2: 97% (01-17-24 @ 20:32) (97% - 98%)  I&O's Summary    17 Jan 2024 07:01  -  17 Jan 2024 22:45  --------------------------------------------------------  IN: 440 mL / OUT: 1350 mL / NET: -910 mL        General: Pt Alert and oriented, NAD  Mauricio DSG C/D/I  Pulses: 2+ DP/PT  Sensation: SILT b/l  Motor: Quad/Ham/EHL/FHL/TA/GS 5/5        Post-op X-Ray: Implants well aligned    A/P: 58yFemale POD#0 s/p R TKA  - Stable  - Pain Control  - DVT ppx:    - Post op abx: Ancef x 2 doses  - PT, WBS: WBAT  - Dispo: Pending PT eval      Ortho Pager [phone]   Ortho Post Op Check    Procedure: R TKA  Surgeon: Dr. Davalos    Pt comfortable without complaints, pain controlled  Denies CP, SOB, N/V, numbness/tingling     Vital Signs Last 24 Hrs  T(C): 36.6 (01-17-24 @ 20:32), Max: 36.6 (01-17-24 @ 20:32)  T(F): 97.8 (01-17-24 @ 20:32), Max: 97.8 (01-17-24 @ 20:32)  HR: 65 (01-17-24 @ 20:32) (64 - 65)  BP: 138/75 (01-17-24 @ 20:32) (138/75 - 148/74)  BP(mean): 96 (01-17-24 @ 20:32) (96 - 96)  RR: 17 (01-17-24 @ 20:32) (17 - 18)  SpO2: 97% (01-17-24 @ 20:32) (97% - 98%)  I&O's Summary    17 Jan 2024 07:01  -  17 Jan 2024 22:45  --------------------------------------------------------  IN: 440 mL / OUT: 1350 mL / NET: -910 mL        General: Pt Alert and oriented, NAD  Mauricio DSG C/D/I  Pulses: 2+ DP/PT  Sensation: SILT b/l  Motor: Quad/Ham/EHL/FHL/TA/GS 5/5        Post-op X-Ray: Implants well aligned    A/P: 58yFemale POD#0 s/p R TKA  - Stable  - Pain Control  - DVT ppx:    - Post op abx: Ancef x 2 doses  - PT, WBS: WBAT  - Dispo: Pending PT eval      Ortho Pager [phone]

## 2024-01-17 NOTE — PHYSICAL THERAPY INITIAL EVALUATION ADULT - PERTINENT HX OF CURRENT PROBLEM, REHAB EVAL
Patient is a 58 year old female with right knee pain despite conservative therapies for her symptoms including right knee injections and OTC pain control with aleve. Denies history of DVT.

## 2024-01-17 NOTE — PHYSICAL THERAPY INITIAL EVALUATION ADULT - ADDITIONAL COMMENTS
Patient reports she lives with her  in elevator apartment with no SAGAR. PTA patient was independent with all mobility and ADLs

## 2024-01-17 NOTE — PHYSICAL THERAPY INITIAL EVALUATION ADULT - THERAPY FREQUENCY, PT EVAL
daily CXR negative - No CHF, No cardiomegaly, No pleural effusionsCXR negative - No infiltrates, No consolidation, No atelectasis seen mediastinum normal

## 2024-01-17 NOTE — PHYSICAL THERAPY INITIAL EVALUATION ADULT - GENERAL OBSERVATIONS, REHAB EVAL
REED Hannah informed of intent to treat. Patient received semi-supine in NAD with +heplock +R knee dressing clean/dry/intact +CONNOR +SCD x 2

## 2024-01-17 NOTE — PHYSICAL THERAPY INITIAL EVALUATION ADULT - GAIT DEVIATIONS NOTED, PT EVAL
patient with fairly steady gait, no LOB, gait distance limited by decreased sensation of LLE/decreased angelica/decreased step length

## 2024-01-17 NOTE — PHYSICAL THERAPY INITIAL EVALUATION ADULT - DID THE PATIENT HAVE SURGERY?
Final Anesthesia Post-op Assessment    Patient: Foster Lang  Procedure(s) Performed: PHACOEMULSIFICATION CATARACT WITH INTRAOCULAR LENS IMPLANT RIGHT EYE - RIGHT  Anesthesia type: MAC    Vitals Value Taken Time   Temp 36.2 °C (97.2 °F) 05/03/22 0910   Pulse 75 05/03/22 0930   Resp 14 05/03/22 0930   SpO2 93 % 05/03/22 0910   /98 05/03/22 0910         Patient Location: Phase II  Post-op Vital Signs:stable  Level of Consciousness: awake and alert  Respiratory Status: spontaneous ventilation  Cardiovascular stable  Hydration: euvolemic  Pain Management: adequately controlled  Handoff: Handoff to receiving nurse was performed and questions were answered  Vomiting: none  Nausea: None  Airway Patency:patent  Post-op Assessment: no complications and patient tolerated procedure well with no complications      No complications documented.    R TKA/yes

## 2024-01-17 NOTE — PHYSICAL THERAPY INITIAL EVALUATION ADULT - GROSSLY INTACT, SENSORY
patient reports absent sensation on medial aspect of R knee and decreased sensation to light touch distal to R knee

## 2024-01-18 ENCOUNTER — TRANSCRIPTION ENCOUNTER (OUTPATIENT)
Age: 59
End: 2024-01-18

## 2024-01-18 VITALS
RESPIRATION RATE: 18 BRPM | TEMPERATURE: 98 F | DIASTOLIC BLOOD PRESSURE: 72 MMHG | OXYGEN SATURATION: 99 % | HEART RATE: 64 BPM | SYSTOLIC BLOOD PRESSURE: 149 MMHG

## 2024-01-18 LAB
ANION GAP SERPL CALC-SCNC: 7 MMOL/L — SIGNIFICANT CHANGE UP (ref 5–17)
BUN SERPL-MCNC: 7 MG/DL — SIGNIFICANT CHANGE UP (ref 7–23)
CALCIUM SERPL-MCNC: 8.6 MG/DL — SIGNIFICANT CHANGE UP (ref 8.4–10.5)
CHLORIDE SERPL-SCNC: 102 MMOL/L — SIGNIFICANT CHANGE UP (ref 96–108)
CO2 SERPL-SCNC: 29 MMOL/L — SIGNIFICANT CHANGE UP (ref 22–31)
CREAT SERPL-MCNC: 0.73 MG/DL — SIGNIFICANT CHANGE UP (ref 0.5–1.3)
EGFR: 95 ML/MIN/1.73M2 — SIGNIFICANT CHANGE UP
GLUCOSE SERPL-MCNC: 97 MG/DL — SIGNIFICANT CHANGE UP (ref 70–99)
HCT VFR BLD CALC: 33.4 % — LOW (ref 34.5–45)
HCV AB S/CO SERPL IA: 0.03 S/CO — SIGNIFICANT CHANGE UP
HCV AB SERPL-IMP: SIGNIFICANT CHANGE UP
HGB BLD-MCNC: 10.8 G/DL — LOW (ref 11.5–15.5)
MCHC RBC-ENTMCNC: 30.2 PG — SIGNIFICANT CHANGE UP (ref 27–34)
MCHC RBC-ENTMCNC: 32.3 GM/DL — SIGNIFICANT CHANGE UP (ref 32–36)
MCV RBC AUTO: 93.3 FL — SIGNIFICANT CHANGE UP (ref 80–100)
NRBC # BLD: 0 /100 WBCS — SIGNIFICANT CHANGE UP (ref 0–0)
PLATELET # BLD AUTO: 249 K/UL — SIGNIFICANT CHANGE UP (ref 150–400)
POTASSIUM SERPL-MCNC: 4.1 MMOL/L — SIGNIFICANT CHANGE UP (ref 3.5–5.3)
POTASSIUM SERPL-SCNC: 4.1 MMOL/L — SIGNIFICANT CHANGE UP (ref 3.5–5.3)
RBC # BLD: 3.58 M/UL — LOW (ref 3.8–5.2)
RBC # FLD: 13.4 % — SIGNIFICANT CHANGE UP (ref 10.3–14.5)
SODIUM SERPL-SCNC: 138 MMOL/L — SIGNIFICANT CHANGE UP (ref 135–145)
WBC # BLD: 9.25 K/UL — SIGNIFICANT CHANGE UP (ref 3.8–10.5)
WBC # FLD AUTO: 9.25 K/UL — SIGNIFICANT CHANGE UP (ref 3.8–10.5)

## 2024-01-18 PROCEDURE — 86803 HEPATITIS C AB TEST: CPT

## 2024-01-18 PROCEDURE — 97161 PT EVAL LOW COMPLEX 20 MIN: CPT

## 2024-01-18 PROCEDURE — 86901 BLOOD TYPING SEROLOGIC RH(D): CPT

## 2024-01-18 PROCEDURE — 97110 THERAPEUTIC EXERCISES: CPT

## 2024-01-18 PROCEDURE — 97165 OT EVAL LOW COMPLEX 30 MIN: CPT

## 2024-01-18 PROCEDURE — 86850 RBC ANTIBODY SCREEN: CPT

## 2024-01-18 PROCEDURE — 85027 COMPLETE CBC AUTOMATED: CPT

## 2024-01-18 PROCEDURE — 73560 X-RAY EXAM OF KNEE 1 OR 2: CPT

## 2024-01-18 PROCEDURE — C1713: CPT

## 2024-01-18 PROCEDURE — 36415 COLL VENOUS BLD VENIPUNCTURE: CPT

## 2024-01-18 PROCEDURE — 97116 GAIT TRAINING THERAPY: CPT

## 2024-01-18 PROCEDURE — 86900 BLOOD TYPING SEROLOGIC ABO: CPT

## 2024-01-18 PROCEDURE — C1776: CPT

## 2024-01-18 PROCEDURE — 80048 BASIC METABOLIC PNL TOTAL CA: CPT

## 2024-01-18 PROCEDURE — 99222 1ST HOSP IP/OBS MODERATE 55: CPT

## 2024-01-18 RX ORDER — SENNA PLUS 8.6 MG/1
2 TABLET ORAL
Qty: 14 | Refills: 0
Start: 2024-01-18 | End: 2024-01-24

## 2024-01-18 RX ORDER — CELECOXIB 200 MG/1
200 CAPSULE ORAL
Refills: 0 | Status: DISCONTINUED | OUTPATIENT
Start: 2024-01-18 | End: 2024-01-18

## 2024-01-18 RX ORDER — OXYCODONE HYDROCHLORIDE 5 MG/1
1 TABLET ORAL
Qty: 30 | Refills: 0
Start: 2024-01-18

## 2024-01-18 RX ORDER — ASPIRIN/CALCIUM CARB/MAGNESIUM 324 MG
1 TABLET ORAL
Qty: 10 | Refills: 0
Start: 2024-01-18 | End: 2024-01-27

## 2024-01-18 RX ORDER — PANTOPRAZOLE SODIUM 20 MG/1
1 TABLET, DELAYED RELEASE ORAL
Qty: 10 | Refills: 0
Start: 2024-01-18 | End: 2024-01-27

## 2024-01-18 RX ORDER — PANTOPRAZOLE SODIUM 20 MG/1
40 TABLET, DELAYED RELEASE ORAL
Refills: 0 | Status: DISCONTINUED | OUTPATIENT
Start: 2024-01-18 | End: 2024-01-18

## 2024-01-18 RX ORDER — ACETAMINOPHEN 500 MG
2 TABLET ORAL
Qty: 0 | Refills: 0 | DISCHARGE
Start: 2024-01-18

## 2024-01-18 RX ORDER — PANTOPRAZOLE SODIUM 20 MG/1
1 TABLET, DELAYED RELEASE ORAL
Qty: 14 | Refills: 0
Start: 2024-01-18 | End: 2024-01-31

## 2024-01-18 RX ORDER — BENZOCAINE AND MENTHOL 5; 1 G/100ML; G/100ML
1 LIQUID ORAL ONCE
Refills: 0 | Status: COMPLETED | OUTPATIENT
Start: 2024-01-18 | End: 2024-01-18

## 2024-01-18 RX ORDER — CELECOXIB 200 MG/1
1 CAPSULE ORAL
Qty: 28 | Refills: 0
Start: 2024-01-18 | End: 2024-01-31

## 2024-01-18 RX ORDER — NALOXONE HYDROCHLORIDE 4 MG/.1ML
4 SPRAY NASAL
Qty: 1 | Refills: 0
Start: 2024-01-18

## 2024-01-18 RX ADMIN — Medication 1000 MILLIGRAM(S): at 05:08

## 2024-01-18 RX ADMIN — OXYCODONE HYDROCHLORIDE 10 MILLIGRAM(S): 5 TABLET ORAL at 06:23

## 2024-01-18 RX ADMIN — Medication 325 MILLIGRAM(S): at 11:54

## 2024-01-18 RX ADMIN — Medication 25 MILLIGRAM(S): at 13:47

## 2024-01-18 RX ADMIN — OXYCODONE HYDROCHLORIDE 10 MILLIGRAM(S): 5 TABLET ORAL at 02:23

## 2024-01-18 RX ADMIN — Medication 100 MILLIGRAM(S): at 05:09

## 2024-01-18 RX ADMIN — Medication 1000 MILLIGRAM(S): at 13:00

## 2024-01-18 RX ADMIN — OXYCODONE HYDROCHLORIDE 10 MILLIGRAM(S): 5 TABLET ORAL at 03:23

## 2024-01-18 RX ADMIN — OXYCODONE HYDROCHLORIDE 10 MILLIGRAM(S): 5 TABLET ORAL at 07:23

## 2024-01-18 RX ADMIN — BENZOCAINE AND MENTHOL 1 LOZENGE: 5; 1 LIQUID ORAL at 05:13

## 2024-01-18 RX ADMIN — BUPROPION HYDROCHLORIDE 300 MILLIGRAM(S): 150 TABLET, EXTENDED RELEASE ORAL at 11:54

## 2024-01-18 RX ADMIN — OXYCODONE HYDROCHLORIDE 10 MILLIGRAM(S): 5 TABLET ORAL at 10:32

## 2024-01-18 RX ADMIN — BENZOCAINE AND MENTHOL 1 LOZENGE: 5; 1 LIQUID ORAL at 15:18

## 2024-01-18 RX ADMIN — OXYCODONE HYDROCHLORIDE 10 MILLIGRAM(S): 5 TABLET ORAL at 11:32

## 2024-01-18 RX ADMIN — OXYCODONE HYDROCHLORIDE 10 MILLIGRAM(S): 5 TABLET ORAL at 14:58

## 2024-01-18 RX ADMIN — OXYCODONE HYDROCHLORIDE 10 MILLIGRAM(S): 5 TABLET ORAL at 15:58

## 2024-01-18 NOTE — DISCHARGE NOTE NURSING/CASE MANAGEMENT/SOCIAL WORK - NSDCPEFALRISK_GEN_ALL_CORE
For information on Fall & Injury Prevention, visit: [email or web address] OR  [email or web address] OR  https://www.cdc.gov/steadi/patient.html

## 2024-01-18 NOTE — DISCHARGE NOTE PROVIDER - NSDCFUADDINST_GEN_ALL_CORE_FT
ACTIVITY:   - Weight bear as tolerated with assistive device. No strenuous activity, heavy lifting, driving or returning to work until cleared by MD.   - Apply a cold compress to the surgical site several times daily to reduce pain and swelling. For icing, twenty-minute sessions followed by an hour off is recommended. You should ice as frequently as possible. Ice should NEVER be placed directly on the skin. Wearing compression stockings during the first week after surgery can help reduce swelling in your knee, calf and foot, but is not required.      (KNEE REPLACEMENTS)   DO place a pillow under your heel when elevating the leg, to encourage full extension of the knee. Do NOT place a pillow behind your knee for comfort, as this can lead to permanent difficulty straightening your leg. It is normal to develop some swelling in the leg, ankle, and foot because of gravity.     DRESSING/SHOWERING:   (CONNOR – incisional wound vac)   - You have an incisional wound vac dressing with tubing to attached canister/battery pack. You may shower but must keep battery pack dry at all times. The battery dies in 7 days then can remove dressing and leave open to air. Keep your incision clean and dry. Do not pick at your incision. Do not apply creams, ointments or oils to your incision until cleared by your surgeon. Do not soak your incision in sitting water (ie tubs, pools, lakes, etc.) until cleared by your surgeon. Do not scrub the incision – instead, allow soap and water to flow over the incision and then pat it dry with a clean towel.     MEDICATION/ANTICOAGULATION:   -You have been prescribed Aspirin 325mg daily, as a preventative to help prevent postoperative blood clots. Please take this medication as prescribed.    - You have been prescribed medications for pain:     - Tylenol for mild to moderate pain. Do not exceed 3,000mg daily.     - For more severe pain, take Tylenol with the addition of narcotic pain medication. Take this medication as prescribed. This medication may cause drowsiness or dizziness. Do not operate machinery. This medication may cause constipation.   - For any additional medications, follow instructions on the bottle.    -Try to have regular bowel movements. Take stool softener or laxative if necessary. You may wish to take Miralax daily until you have regular bowel movements.    - If you have been prescribed Aspirin or an anti-inflammatory, please take prilosec (omeprazole) once a day, before breakfast, until no longer taking Aspirin or anti-inflammatory. This will help protect your stomach.   - If you have a pain management physician, please follow-up with them postoperatively.    - If you experience any negative side effects of your medications, please call your surgeon's office to discuss.      FOLLOW-UP:   - Call to schedule an appt with Dr. Davalos for follow up.   - Please follow-up with your primary care physician or any other specialist you see postoperatively, if needed.    - Contact your doctor or go to the emergency room if you experience: fever greater than 101.5, chills, chest pain, difficulty breathing, redness or excessive drainage around the incision, other concerns.

## 2024-01-18 NOTE — DISCHARGE NOTE PROVIDER - YES NO FOR [facility] POSITIVE OR NEGATIVE COVID RESULT
Potential shared bed avail on 20   521pm - Intake called CRN on unit, reviewed pt and reports the pt is not appropriate for shared room, should start in a single occupancy room based on code earlier this morning. Would be appropriate to reassess tomorrow     Pt remains on work list until appropriate placement is available     
S:  3 PM  Call from DEC  at [facility] ED requesting IP MH bed for a 51 YO F    B:  Hx of depression, anxiety,  SI most of her life, last few weeks have become much worse and now has SI w/ plan to overdose or step in front of car, stressors include lost her  job and place to live and no support from her family,   feels like a failure. Substance use, alcohol.   Sober for several days, then drank excessively last night. Hx of   HTN, hasn't taken her meds in several days.  Upon arrival to ED, was intoxicated and  swung at security; received zyprexa; can't commit to safety outside of the hospital . No hx of MH hospitalizations, no MH medications.    Utox NEG  Blood alcohol level 10:32 AM  0.25  BMP  3.2  Call to [facility] ED requested COVID test-will do asap    A:  [facility] HH placed 9/1/22 @  [place]    R:  Patient cleared and ready for behavioral bed placement: Yes  
Updated Bed Search @ 6pm  Per chart review[place]     [facility] has 0 appropriate beds available. Phone: [phone]  [facility] posting 0 available beds. Phone: [phone]  [facility] posting 0 available beds. Phone: [phone]  [facility] posting 0 available beds. Phone: [phone]  [facility] posting 0 available beds. Phone: [phone]  [facility] posting 0 available beds. Phone:[phone]  [facility] posting 0 available beds. Phone: [phone]  [facility] posting 0 available beds. Phone: [phone]     Pt remains on work list pending appropriate bed placement.  
,

## 2024-01-18 NOTE — CONSULT NOTE ADULT - SUBJECTIVE AND OBJECTIVE BOX
See below for orthohpi  "59 y/o female with right knee pain despite conservative therapies for her symptoms including right knee injections and OTC pain control with aleve. Denies history of DVT.   Patient elects to undergo right total knee replacement with Dr. Davalos       Review of Systems:  Review of Systems: musculoskeletal: +joint pain at the right knee   see HPI  Other Review of Systems: All other review of systems negative, except as noted in HPI      Allergies and Intolerances:        Allergies:  	No Known Drug Allergies:   	adhesives: Miscellaneous, Rash, TAPE    Home Medications:   * Patient Currently Takes Medications as of 16-Jan-2024 10:04 documented in Structured Notes  · 	Xanax 1 mg oral tablet: Last Dose Taken:  , 1 tab(s) orally prn  · 	Wellbutrin  mg/24 hours oral tablet, extended release: Last Dose Taken:  , 1 tab(s) orally once a day  · 	Mounjaro 5 mg/0.5 mL subcutaneous solution: Last Dose Taken: 25-Dec-2023 , 5 milligram(s) subcutaneously every 7 days    Patient History:   Past Medical, Past Surgical, and Family History:  PAST MEDICAL HISTORY:  Anxiety     Osteoarthritis.     PAST SURGICAL HISTORY:  H/O varicose vein ligation b/l.    Social History:  · Substance use	No  · Social History (marital status, living situation, occupation, and sexual history)	per chart review never smoker    Tobacco Screening:  · Core Measure Site	No  · Has the patient used tobacco in the past 30 days?	No     "    Physical exam  General: nad  heent: ncat, mmm  cards: rrr, normal s1s2  pulm: ctab, no wheeze  ab: soft, ntnd  neuro: fluent speech, no asymmetry

## 2024-01-18 NOTE — OCCUPATIONAL THERAPY INITIAL EVALUATION ADULT - IMPAIRED TRANSFERS: TOILET, REHAB EVAL
Clonazepam        Last Written Prescription Date:  5/5/2021  Last Fill Quantity: 30,   # refills: 0  Last Office Visit: 2/15/2021  Future Office visit:       Routing refill request to provider for review/approval because:  Drug not on the [facility], [facility] or [facility] refill protocol or controlled substance     impaired balance/decreased strength

## 2024-01-18 NOTE — OCCUPATIONAL THERAPY INITIAL EVALUATION ADULT - DIAGNOSIS, OT EVAL
Pt. is POD#1 (1/17) s/p R total knee replacement. Upon assessment, pt. requires increased assistance in LB ADLs therefore performing less than her pre-operative baseline.

## 2024-01-18 NOTE — PROGRESS NOTE ADULT - SUBJECTIVE AND OBJECTIVE BOX
Ortho Note    Patient seen and examined at bedside. Pt comfortable without complaints, pain controlled. States ice packs have been helping her greatly with pain. Voiding freely, passing flatus.   Denies CP, SOB, N/V, new numbness/tingling     Vital Signs Last 24 Hrs  T(C): 37 (01-18-24 @ 09:30), Max: 37 (01-18-24 @ 09:30)  T(F): 98.6 (01-18-24 @ 09:30), Max: 98.6 (01-18-24 @ 09:30)  HR: 61 (01-18-24 @ 09:30) (60 - 61)  BP: 113/61 (01-18-24 @ 09:30) (113/61 - 134/74)  BP(mean): 94 (01-18-24 @ 05:17) (94 - 94)  RR: 18 (01-18-24 @ 09:30) (17 - 18)  SpO2: 97% (01-18-24 @ 09:30) (97% - 97%)  I&O's Summary    17 Jan 2024 07:01  -  18 Jan 2024 07:00  --------------------------------------------------------  IN: 440 mL / OUT: 2150 mL / NET: -1710 mL    18 Jan 2024 07:01  -  18 Jan 2024 10:28  --------------------------------------------------------  IN: 440 mL / OUT: 0 mL / NET: 440 mL        General: Pt Alert and oriented, NAD  DSG C/D/I- CONNOR to right knee with overlying ice packs  Pulses: 2+ DP pulses palpable bilaterally, skin wwp, cap refill brisk, no calf tenderness bilaterally  Sensation: SILT to bilateral lower extremities  Motor: EHL/FHL/TA/GS 5/5 bilaterally                          10.8   9.25  )-----------( 249      ( 18 Jan 2024 08:05 )             33.4     01-18    138  |  102  |  7   ----------------------------<  97  4.1   |  29  |  0.73    Ca    8.6      18 Jan 2024 08:05        A/P: 57yo Female POD#1 s/p Right TKA   - Stable  - Pain Control  - OOB/IS  - Bowel Regimen  - DVT ppx: ASA 325mg daily  - PT, WBS: WBAT  - Dispo: HPT pending PT clearance    Ortho Pager [phone]

## 2024-01-18 NOTE — DISCHARGE NOTE PROVIDER - NSDCMRMEDTOKEN_GEN_ALL_CORE_FT
acetaminophen 500 mg oral tablet: 2 tab(s) orally every 8 hours  Mounjaro 5 mg/0.5 mL subcutaneous solution: 5 milligram(s) subcutaneously every 7 days  Wellbutrin  mg/24 hours oral tablet, extended release: 1 tab(s) orally once a day  Xanax 1 mg oral tablet: 1 tab(s) orally prn   acetaminophen 500 mg oral tablet: 2 tab(s) orally every 8 hours  aspirin 325 mg oral tablet: 1 tab(s) orally once a day  celecoxib 200 mg oral capsule: 1 cap(s) orally 2 times a day  Mounjaro 5 mg/0.5 mL subcutaneous solution: 5 milligram(s) subcutaneously every 7 days  Narcan 4 mg/0.1 mL nasal spray: 4 milligram(s) intranasally prn Follow instructions on packaging  oxyCODONE 5 mg oral tablet: 1 tab(s) orally every 4 hours as needed for  severe pain MDD: 6  pantoprazole 40 mg oral delayed release tablet: 1 tab(s) orally once a day (before a meal)  senna leaf extract oral tablet: 2 tab(s) orally once a day (at bedtime)  Wellbutrin  mg/24 hours oral tablet, extended release: 1 tab(s) orally once a day  Xanax 1 mg oral tablet: 1 tab(s) orally prn

## 2024-01-18 NOTE — OCCUPATIONAL THERAPY INITIAL EVALUATION ADULT - ADDITIONAL COMMENTS
Pt. reports residing with her spouse in an elevator accessible apartment with no SAGAR. Pt. was I prior in ADLs and functional mob/transfers without AD. BR with tub/shower, reports she just purchased a shower chair but has not used it pre-op

## 2024-01-18 NOTE — DISCHARGE NOTE PROVIDER - CARE PROVIDER_API CALL
Luis Davalos  Orthopaedic Surgery  [address] [place] [address], [address]  [place] [address]  Phone: [phone]  Fax: [phone]  Follow Up Time: 2 weeks

## 2024-01-18 NOTE — DISCHARGE NOTE NURSING/CASE MANAGEMENT/SOCIAL WORK - PATIENT PORTAL LINK FT
You can access the FollowMyHealth Patient Portal offered by [facility] by registering at the following website: [email or web address]. By joining [facility]’s FollowMyHealth portal, you will also be able to view your health information using other applications (apps) compatible with our system.

## 2024-01-18 NOTE — DISCHARGE NOTE PROVIDER - HOSPITAL COURSE
Admitted 1/17/24 to [facility]  Surgery 1/18/24 Right total knee replacement  [place]-op Antibiotics  Pain control  DVT prophylaxis  OOB/Physical Therapy  Inpatient Events: Stable Admitted 1/17/24 to [facility] Orthoped[facility]  Surgery 1/17/24 Right total knee replacement  Eulalia-op Antibiotics  Pain control  DVT prophylaxis  OOB/Physical Therapy  Inpatient Events: Stable

## 2024-01-18 NOTE — OCCUPATIONAL THERAPY INITIAL EVALUATION ADULT - GENERAL OBSERVATIONS, REHAB EVAL
OT IE complete. RN Brielle clearing pt. for session. Pt. received semi-supine in bed, +b/l SCD, +CONNOR, +heplock, + R knee dressing C/D/I in NAD, agreeable to therapy session

## 2024-01-18 NOTE — CONSULT NOTE ADULT - ASSESSMENT
58F presenting for R TKR.     #S/p R TKR  - plan per ortho team  - being discharged today  - discussed using laxatives/stool softeners if taking narcotics    60 minutes spent on this encounter, including face to face with patient, care coordination and documentation.  Plan discussed with orthopedic team.

## 2024-01-18 NOTE — OCCUPATIONAL THERAPY INITIAL EVALUATION ADULT - MODIFIED CLINICAL TEST OF SENSORY INTEGRATION IN BALANCE TEST
Pt. performed functional mob in [facility] with SBA and RW, no LOB, noted w. decreased angelica 2/2 RLE stiffness

## 2024-01-18 NOTE — OCCUPATIONAL THERAPY INITIAL EVALUATION ADULT - PERTINENT HX OF CURRENT PROBLEM, REHAB EVAL
59 y/o female with right knee pain despite conservative therapies for her symptoms including right knee injections and OTC pain control with aleve. Denies history of DVT.   Patient elects to undergo right total knee replacement with Dr. Davalos

## 2024-01-19 PROBLEM — M19.90 UNSPECIFIED OSTEOARTHRITIS, UNSPECIFIED SITE: Chronic | Status: ACTIVE | Noted: 2024-01-16

## 2024-01-23 DIAGNOSIS — F41.9 ANXIETY DISORDER, UNSPECIFIED: ICD-10-CM

## 2024-01-23 DIAGNOSIS — M17.11 UNILATERAL PRIMARY OSTEOARTHRITIS, RIGHT KNEE: ICD-10-CM

## 2024-01-23 DIAGNOSIS — Z91.048 OTHER NONMEDICINAL SUBSTANCE ALLERGY STATUS: ICD-10-CM

## 2024-01-25 RX ORDER — OXYCODONE 5 MG/1
5 TABLET ORAL EVERY 8 HOURS
Qty: 12 | Refills: 0 | Status: ACTIVE | COMMUNITY
Start: 2024-01-25 | End: 1900-01-01

## 2024-02-01 ENCOUNTER — APPOINTMENT (OUTPATIENT)
Dept: ORTHOPEDIC SURGERY | Facility: CLINIC | Age: 59
End: 2024-02-01
Payer: COMMERCIAL

## 2024-02-01 PROBLEM — F41.9 ANXIETY DISORDER, UNSPECIFIED: Chronic | Status: ACTIVE | Noted: 2024-01-16

## 2024-02-01 PROCEDURE — 99024 POSTOP FOLLOW-UP VISIT: CPT

## 2024-02-01 NOTE — DISCUSSION/SUMMARY
[de-identified] : Patient doing quite well status postop day #14 right knee replacement.  Patient will be transition to outpatient physical therapy follow-up in 1 month's time.

## 2024-02-01 NOTE — HISTORY OF PRESENT ILLNESS
[de-identified] : Patient returns today  Postop day #14 right knee replacement she is very pleased with the results she is fairly active is somewhat surprised to be in so little pain.

## 2024-02-01 NOTE — PHYSICAL EXAM
[de-identified] : Right knee wound is well-healed appropriate swelling neurovascular intact distally no evidence of erythema or drainage range of motion 0 to 130 degrees.  The knee is stable extra stress on stress both full extension and 90 degrees of flexion.

## 2024-02-01 NOTE — REASON FOR VISIT
[Follow-Up Visit] : a follow-up visit for [Artificial Knee Joint] : an artificial knee joint [Total Knee Replacement Surgery, Aftercare] : total knee replacement surgery, aftercare [FreeTextEntry2] : RIGHT KNEE REPLACED 1/17. 1ST POA VISIT

## 2024-02-29 ENCOUNTER — APPOINTMENT (OUTPATIENT)
Dept: ORTHOPEDIC SURGERY | Facility: CLINIC | Age: 59
End: 2024-02-29

## 2024-03-04 ENCOUNTER — APPOINTMENT (OUTPATIENT)
Dept: ORTHOPEDIC SURGERY | Facility: CLINIC | Age: 59
End: 2024-03-04

## 2024-03-08 ENCOUNTER — APPOINTMENT (OUTPATIENT)
Dept: ORTHOPEDIC SURGERY | Facility: CLINIC | Age: 59
End: 2024-03-08
Payer: COMMERCIAL

## 2024-03-08 PROCEDURE — 99024 POSTOP FOLLOW-UP VISIT: CPT

## 2024-03-08 PROCEDURE — 73562 X-RAY EXAM OF KNEE 3: CPT | Mod: RT

## 2024-03-08 RX ORDER — ACETAMINOPHEN AND CODEINE PHOSPHATE 300; 30 MG/1; MG/1
300-30 TABLET ORAL
Qty: 10 | Refills: 0 | Status: ACTIVE | COMMUNITY
Start: 2024-03-08 | End: 1900-01-01

## 2024-03-08 NOTE — PHYSICAL EXAM
[de-identified] : Right knee on exam today has range of motion of 0 to 130 degrees.  The wound is well-healed.  There is no undue swelling neurovascular tact distally. [de-identified] : Patient had knee x-rays ordered today.  AP standing individual lateral sunrise views were obtained showing a well-positioned right sided knee replacement Chávez and nephew journey type.

## 2024-03-08 NOTE — DISCUSSION/SUMMARY
[de-identified] : Patient will continue with her own exercise regimen as well as formal PT for 1 more month once a week follow-up in 6 weeks time for final check.

## 2024-03-08 NOTE — REASON FOR VISIT
[Follow-Up Visit] : a follow-up visit for [Artificial Knee Joint] : an artificial knee joint [Total Knee Replacement Surgery, Aftercare] : total knee replacement surgery, aftercare [FreeTextEntry2] : post op right knee replaced 1/17

## 2024-03-08 NOTE — HISTORY OF PRESENT ILLNESS
[de-identified] : Patient returns today approximate 6-week status post right knee replacement doing very well very pleased with the result so far minimal complaints.

## 2024-04-19 ENCOUNTER — NON-APPOINTMENT (OUTPATIENT)
Age: 59
End: 2024-04-19

## 2024-04-26 ENCOUNTER — APPOINTMENT (OUTPATIENT)
Dept: ORTHOPEDIC SURGERY | Facility: CLINIC | Age: 59
End: 2024-04-26
Payer: COMMERCIAL

## 2024-04-26 DIAGNOSIS — M17.11 UNILATERAL PRIMARY OSTEOARTHRITIS, RIGHT KNEE: ICD-10-CM

## 2024-04-26 PROCEDURE — 99214 OFFICE O/P EST MOD 30 MIN: CPT

## 2024-04-26 RX ORDER — CELECOXIB 200 MG/1
200 CAPSULE ORAL
Qty: 30 | Refills: 0 | Status: ACTIVE | COMMUNITY
Start: 2024-04-26 | End: 1900-01-01

## 2024-04-26 NOTE — DISCUSSION/SUMMARY
[de-identified] : Patient I discussed that she has very good range of motion which portends a very good final outcome for her.  I believe she has some persistent mild low-grade swelling and soreness due to her overall high activity level.  Patient was encouraged to continue icing the knee when she notices some swelling at the end of a long day she will also be placed on a trial of Celebrex 200 mg p.o. twice daily for 6-day course then to be taken thereafter as needed for soreness.  The reasonable risk and benefits of medication were discussed in detail including potential side effects.  We reviewed the patient's current medication profile appears to be no relative contraindication to his current limited use.  Patient will follow-up in 6 weeks if she continues to have symptoms otherwise follow-up at the 1 year anniversary.  Today's consultation lasted 30 minutes.

## 2024-04-26 NOTE — PHYSICAL EXAM
[de-identified] : Right knee wound is well-healed range of motion 0 to 140 degrees knee stable extra stress valgus stress with full extension and 90 degrees of flexion.  Appropriate swelling minimal warmth no effusion noted.

## 2024-04-26 NOTE — HISTORY OF PRESENT ILLNESS
[de-identified] : Patient returns today approximately 4 and half months after right knee replacement surgery she doing quite well she is returned to full work with no restrictions she only notices some persistent swelling and warmth about the knee after a long day at work she occasionally wears a compression stocking but overall is very pleased with the result.

## 2024-04-26 NOTE — REASON FOR VISIT
[Follow-Up Visit] : a follow-up visit for [Artificial Knee Joint] : an artificial knee joint [FreeTextEntry2] : RIGHT KNEE REPLACED 1/17. CHECK UP.

## 2024-05-09 NOTE — PHYSICAL THERAPY INITIAL EVALUATION ADULT - [facility] WT BEARING STATUS EVAL
ED Attending Physician Note      Patient : Bre Malhotra Age: 65 year old Sex: female   MRN: [id number] Encounter Date: 5/9/2024    Teaching-Supervisory Addendum    I participated in the following activities of this patient's care: The medical history, the physical exam, medical decision making.  I personally performed: Supervision of the patient's care, the history, the physical exam, the medical decision making  The case was discussed with: The resident Marie  Procedures: I was present for key portions of the procedure and was immediately available for the non-key portions.  Evaluation and management service: I agree with the evaluation and management decisions made in this patient's care.    Hyun Storm MD  5/9/2024      History     Chief Complaint   Patient presents with    Leg Swelling       HPI:     65-year-old female with past medical history of right total knee replacement on April 17 who presents with report of right leg swelling.  Patient reports that she has been recovering well after her total knee replacement.  She denies any pain.  She does report that when she is sitting or standing for a while she has more swelling in the right leg, however she elevates the leg at night when she is sleeping and when she wakes up in the morning there is no swelling.  She denies any pain at the calf or the knee.  She is on Xarelto postsurgery and continues on this medication.  She saw physical therapy for the first time today and they noticed right leg swelling and were concerned for DVT, so recommended that she come to the emergency department for an ultrasound.  Patient denies any fever, chills.  No chest pain or shortness of breath.  No cough.    Allergies   Allergen Reactions    Lisinopril Cough    Amoxicillin-Pot Clavulanate Other (See Comments)     Perineal irritation / pealing     Rosuvastatin Muscle Spasm     UTI    Simvastatin MYALGIA       Past Medical History:   Diagnosis Date    GERD (gastroesophageal  reflux disease)     HLD     HTN     PAD     Type 2 DM        Past Surgical History:   Procedure Laterality Date    BREAST SURGERY      hidraniditis       Family History   Problem Relation Age of Onset    Diabetes Mother     Hypertension Mother     Heart disease Mother 58    Hypertension Father     Heart disease Father 60    Cancer Sister     Heart disease Brother     Heart disease Brother     Substance Abuse Brother     Cancer Maternal Uncle        Social History     Tobacco Use    Smoking status: Former    Smokeless tobacco: Never    Tobacco comments:     10 yrs ago or more, durings teens, early 40's   Vaping Use    Vaping status: never used   Substance Use Topics    Alcohol use: Not Currently    Drug use: Never         Review of Systems  Constitutional symptoms: no fever  Skin symptoms: no rash  Eye symptoms: vision unchanged  ENMT symptoms: no sore throat  Respiratory symptoms: no shortness of breath, no cough  Cardiovascular symptoms: no chest pain   symptoms: no dysuria  GI symptoms: no abdominal pain, no nausea, no vomiting, no diarrhea  MSK symptoms: no back pain +leg swelling  Neuro symptoms: no headache     Physical Exam     ED Triage Vitals   ED Triage Vitals Group      Temp 05/09/24 1612 97.9 °F (36.6 °C)      Heart Rate 05/09/24 1625 75      Resp 05/09/24 1612 16      BP 05/09/24 1612 (!) 148/76      SpO2 05/09/24 1612 99 %      EtCO2 mmHg --       Height 05/09/24 1612 5' 6\" (1.676 m)      Weight 05/09/24 1612 256 lb 2.8 oz (116.2 kg)      Weight Scale Used 05/09/24 1612 Standing scale      BMI (Calculated) 05/09/24 1612 41.35      IBW/kg (Calculated) 05/09/24 1612 59.3       Physical Exam  General: alert, no acute distress  Skin: warm, dry, pink, no rash  Eye: pupils are equal, round and reactive to light, extraocular movements are intact, normal conjunctiva  ENT: moist mucous membranes  Neck: supple, trachea midline, no LAD  Cardiovascular: normal peripheral perfusion, 2+ edema, regular rate and  rhythm, no murmurs   Pulmonary: respirations are non-labored, lungs are clear to auscultation bilaterally  Back: normal ROM, normal alignment  Musculoskeletal: no clear bony abnormalities; right knee with bandage that is clean, dry, intact; no erythema or warmth at the knee, patient does have active range of motion of the right knee, however does not at its full range yet postsurgery; compartments are soft throughout, 2+ pitting edema at bilateral lower extremities; no tenderness to palpation of the calf, 2+ DP pulses with legs warm and well-perfused  Neuro: patient is spontaneously moving all limbs with no focal neurologic abnormalities  Psych: patient converses appropriately, displays appropriate mood      ED Course     Procedures      Lab Results     No results found for this visit on 05/09/24.    EKG Results       Radiology Results     Imaging Results              US VAS LOWER EXTREMITY VENOUS DUPLEX RIGHT (Final result)  Result time 05/09/24 17:55:17      Final result                   Impression:      No evidence of acute DVT of the adequately visualized right lower extremity  as above.            Electronically Signed by: JOHN FERNANDEZ DO   Signed on: 5/9/2024 5:55 PM   Workstation ID: [id number]               Narrative:    US VASC LOWER EXTREMITY VENOUS DUPLEX RIGHT 5/9/2024 5:24 PM    INDICATION: Right lower leg  Leg swelling. SWELLING 5' 6\" ft 256.17 lb  SWELLING    COMPARISON: April 10, 2024    TECHNIQUE:  Compression real-time sonography combined with color and duplex  Doppler flow evaluation of the right lower extremity veins was performed.     FINDINGS:     RIGHT LOWER EXTREMITY FINDINGS:    Common Femoral Vein: Compressible with normal Doppler consistent with  patency.    Femoral Vein: Compressible with normal Doppler consistent with patency.    Popliteal Vein: Compressible with normal Doppler consistent with patency.    Posterior Tibial Veins: Compressible with normal Doppler consistent  Right LE with  patency.    Peroneal Veins: Compressible with normal Doppler consistent with patency.    Other: No additional findings.    LEFT LOWER EXTREMITY FINDINGS:     Common Femoral Vein: Compressible with normal Doppler consistent with  patency.                                      ED Medication Orders (From admission, onward)      None            ED Course as of 05/09/24 1808   Thu May 09, 2024   [facility] LOWER EXTREMITY VENOUS DUPLEX RIGHT  No evidence of acute DVT  [FF]      ED Course User Index  [FF] Marlo Camejo       Medical Decision Making  65-year-old well-appearing female with right leg swelling post surgical from total knee replacement.  Likely worsened with dependent edema.  No evidence of compartment syndrome.  No erythema or warmth, clinical picture is not consistent with cellulitis.  No evidence of DVT on ultrasound.  Patient provided with results.  Encouraged to continue on Xarelto and proceed with physical therapy.  Encouraged activity and leg elevation when at rest.  Plan for patient to follow-up with orthopedic surgery at planned follow-up visit.  Patient feels comfortable returning home.    Clinical Impression     ED Diagnosis   1. Leg swelling            Disposition        Current Discharge Medication List        Prior to Admission Medications    Details   sennosides-docusate sodium (SENOKOT-S) 8.6-50 MG tablet Take 2 tablets by mouth if needed in the morning and at bedtime for constipation.  Qty: 60 tablet, Refills: 0      acetaminophen (TYLENOL) 500 MG tablet Take 1-2 tablets every 6 hours for mild-moderate pain as needed  Qty: 30 tablet, Refills: 0      naproxen (NAPROSYN) 500 MG tablet Take 1 tablet by mouth twice daily for 2 weeks and then as needed for moderate pain. Okay to take with Xarelto.  Qty: 40 tablet, Refills: 1      oxyCODONE, IMM REL, (ROXICODONE) 5 MG immediate release tablet Take 1 tablet by mouth every 6 (six) hours if needed for severe pain (for severe pain not  controlled with extra strength Tylenol).  Qty: 28 tablet, Refills: 0      rivaroxaban (Xarelto) 10 MG Tab Take 1 tablet by mouth daily for 4 weeks post-operatively to prevent blood clots.  Qty: 30 tablet, Refills: 0      acetaminophen (TYLENOL) 500 MG tablet Take 2 tablets by mouth every 8 hours. For Mild-Moderate Pain (Baseline Pain). Can purchase over the counter  Qty: 60 tablet, Refills: 0      naproxen (NAPROSYN) 500 MG tablet Take 1 tab twice daily for 2 weeks for moderate pain, and then as need once or twice daily for moderate pain  Qty: 40 tablet, Refills: 0      senna (SENOKOT) 8.6 MG tablet Take 1 tablet by mouth daily as needed for Constipation. While taking narcotics  Qty: 40 tablet, Refills: 1      rivaroxaban (XARELTO) 10 MG Tab Take 1 tablet by mouth daily (with breakfast). For prevention of blood clots in operative leg  Qty: 30 tablet, Refills: 0      metFORMIN (GLUCOPHAGE) 500 MG tablet Take 1 tablet by mouth in the morning and 1 tablet in the evening.  Qty: 180 tablet, Refills: 2      pravastatin (PRAVACHOL) 80 MG tablet Take 1 tablet by mouth daily.  Qty: 90 tablet, Refills: 1      cyclobenzaprine (FLEXERIL) 5 MG tablet Take 1/2 to 1 tablet by mouth at bedtime as needed for muscle tension  Qty: 45 tablet, Refills: 1      clindamycin (CLEOCIN T) 1 % topical solution Apply topically 2 times daily.  Qty: 60 mL, Refills: 6      metoPROLOL tartrate (LOPRESSOR) 50 MG tablet Take 1 tablet by mouth 2 times daily.  Qty: 180 tablet, Refills: 1      losartan (COZAAR) 25 MG tablet Take one-HALF tablet by mouth daily.  Qty: 45 tablet, Refills: 3      hydrochlorothiazide (HYDRODIURIL) 50 MG tablet Take 1 tablet by mouth daily.  Qty: 90 tablet, Refills: 3      meloxicam (MOBIC) 15 MG tablet Take 1 tablet by mouth daily as needed for Pain.  Qty: 90 tablet, Refills: 0      ezetimibe (Zetia) 10 MG tablet Take 1 tablet by mouth daily.  Qty: 90 tablet, Refills: 3      Semaglutide (Rybelsus) 7 MG Tab Take 7 mg by mouth  daily.  Qty: 90 tablet, Refills: 3      blood glucose (FREESTYLE TEST STRIPS) test strip Test blood sugar 1 times daily.  Qty: 100 each, Refills: 6      fluticasone (FLONASE) 50 MCG/ACT nasal spray Spray 2 sprays in each nostril daily.  Qty: 48 g, Refills: 1      aspirin 81 MG EC tablet Take 1 tablet by mouth daily.      Blood Glucose Monitoring Suppl (FreeStyle Lite) Device Use to test once daily.  Qty: 1 each, Refills: 0      Lancets 30G Misc Test 1 Time Daily  Qty: 100 each, Refills: 10      Acidophilus Lactobacillus Cap Take 1 each by mouth daily.      Multiple Vitamin (MULTIVITAMIN PO) Take 1 Dose by mouth daily.             Current Discharge Medication List          There is no disposition no dispo time  There is no comment      Hyun Storm MD   5/9/2024 6:02 PM                        Hyun Storm MD  05/09/24 2591

## 2024-05-31 RX ORDER — CELECOXIB 200 MG/1
200 CAPSULE ORAL
Qty: 30 | Refills: 0 | Status: ACTIVE | COMMUNITY
Start: 2024-05-31 | End: 1900-01-01

## 2024-07-01 ENCOUNTER — NON-APPOINTMENT (OUTPATIENT)
Age: 59
End: 2024-07-01

## 2024-10-01 NOTE — PHYSICAL THERAPY INITIAL EVALUATION ADULT - GAIT PATTERN USED, PT EVAL
Rx Refill Note  Requested Prescriptions     Pending Prescriptions Disp Refills    nortriptyline (PAMELOR) 10 MG capsule [Pharmacy Med Name: NORTRIPTYLINE HYDROCHLORI 10 MG CAPSULE] 60 capsule 0     Sig: TAKE 2 CAPSULES BY MOUTH ONCE DAILY EVERY EVENING      Last filled: 8/7/24, 60 with 1 refill.   Last office visit with prescribing clinician: 9/24/2024      Next office visit with prescribing clinician: 12/30/2024     Syl Chavez MA  10/01/24, 08:55 EDT   3-point gait

## 2024-10-08 ENCOUNTER — APPOINTMENT (OUTPATIENT)
Dept: ORTHOPEDIC SURGERY | Facility: CLINIC | Age: 59
End: 2024-10-08
Payer: COMMERCIAL

## 2024-10-08 DIAGNOSIS — M17.11 UNILATERAL PRIMARY OSTEOARTHRITIS, RIGHT KNEE: ICD-10-CM

## 2024-10-08 PROCEDURE — 99214 OFFICE O/P EST MOD 30 MIN: CPT

## 2024-10-08 RX ORDER — METHYLPREDNISOLONE 4 MG/1
4 TABLET ORAL
Qty: 1 | Refills: 1 | Status: ACTIVE | COMMUNITY
Start: 2024-10-08 | End: 1900-01-01

## 2024-10-08 RX ORDER — CELECOXIB 200 MG/1
200 CAPSULE ORAL
Qty: 30 | Refills: 0 | Status: ACTIVE | COMMUNITY
Start: 2024-10-08 | End: 1900-01-01

## 2024-10-21 ENCOUNTER — APPOINTMENT (OUTPATIENT)
Dept: ORTHOPEDIC SURGERY | Facility: CLINIC | Age: 59
End: 2024-10-21
Payer: COMMERCIAL

## 2024-10-21 DIAGNOSIS — M17.11 UNILATERAL PRIMARY OSTEOARTHRITIS, RIGHT KNEE: ICD-10-CM

## 2024-10-21 PROCEDURE — 99214 OFFICE O/P EST MOD 30 MIN: CPT

## 2024-11-21 ENCOUNTER — RX RENEWAL (OUTPATIENT)
Age: 59
End: 2024-11-21

## 2025-01-20 ENCOUNTER — NON-APPOINTMENT (OUTPATIENT)
Age: 60
End: 2025-01-20

## (undated) DEVICE — DRSG PICO NPWT 4X8"

## (undated) DEVICE — SAW BLADE STRYKER SAGITTAL 81.5X12.5X1.19MM

## (undated) DEVICE — MARKING PEN W RULER

## (undated) DEVICE — SUT VICRYL 2-0 27" CT-1

## (undated) DEVICE — POSITIONER FOAM EGG CRATE ULNAR 2PCS (PINK)

## (undated) DEVICE — DRSG COBAN 6"

## (undated) DEVICE — STRYKER 4-PORT MANIFOLD W/SPECIMEN COLLECTION

## (undated) DEVICE — SAW BLADE STRYKER SAGITTAL DUAL CUT TEETH 35X64X.64MM

## (undated) DEVICE — VENODYNE/SCD SLEEVE CALF MEDIUM

## (undated) DEVICE — SUT STRATAFIX SYMMETRIC PDS 1 45CM OS-6

## (undated) DEVICE — PACK TOTAL KNEE

## (undated) DEVICE — SAW BLADE STRYKER SAGITTAL 25X86.5X1.32MM

## (undated) DEVICE — WARMING BLANKET UPPER ADULT